# Patient Record
Sex: FEMALE | Race: WHITE | NOT HISPANIC OR LATINO | Employment: OTHER | URBAN - NONMETROPOLITAN AREA
[De-identification: names, ages, dates, MRNs, and addresses within clinical notes are randomized per-mention and may not be internally consistent; named-entity substitution may affect disease eponyms.]

---

## 2020-10-30 ENCOUNTER — HOSPITAL ENCOUNTER (EMERGENCY)
Facility: HOSPITAL | Age: 70
Discharge: HOME | End: 2020-10-30
Attending: EMERGENCY MEDICINE | Admitting: EMERGENCY MEDICINE
Payer: MEDICARE

## 2020-10-30 ENCOUNTER — APPOINTMENT (EMERGENCY)
Dept: RADIOLOGY | Facility: HOSPITAL | Age: 70
End: 2020-10-30
Payer: MEDICARE

## 2020-10-30 VITALS
OXYGEN SATURATION: 91 % | BODY MASS INDEX: 32.81 KG/M2 | TEMPERATURE: 98.2 F | HEART RATE: 59 BPM | HEIGHT: 66 IN | WEIGHT: 204.15 LBS | DIASTOLIC BLOOD PRESSURE: 65 MMHG | RESPIRATION RATE: 18 BRPM | SYSTOLIC BLOOD PRESSURE: 125 MMHG

## 2020-10-30 DIAGNOSIS — U07.1 COVID-19: Primary | ICD-10-CM

## 2020-10-30 LAB
ALBUMIN SERPL BCP-MCNC: 2.9 G/DL (ref 3.5–5)
ALP SERPL-CCNC: 46 U/L (ref 46–116)
ALT SERPL W P-5'-P-CCNC: 31 U/L (ref 12–78)
ANION GAP SERPL CALCULATED.3IONS-SCNC: 8 MMOL/L (ref 4–13)
APTT PPP: 28 SECONDS (ref 23–37)
AST SERPL W P-5'-P-CCNC: 28 U/L (ref 5–45)
ATRIAL RATE: 62 BPM
BASOPHILS # BLD AUTO: 0.03 THOUSANDS/ΜL (ref 0–0.1)
BASOPHILS NFR BLD AUTO: 0 % (ref 0–1)
BILIRUB SERPL-MCNC: 0.5 MG/DL (ref 0.2–1)
BUN SERPL-MCNC: 16 MG/DL (ref 5–25)
CALCIUM ALBUM COR SERPL-MCNC: 9.2 MG/DL (ref 8.3–10.1)
CALCIUM SERPL-MCNC: 8.3 MG/DL (ref 8.3–10.1)
CHLORIDE SERPL-SCNC: 103 MMOL/L (ref 100–108)
CO2 SERPL-SCNC: 27 MMOL/L (ref 21–32)
CREAT SERPL-MCNC: 0.86 MG/DL (ref 0.6–1.3)
EOSINOPHIL # BLD AUTO: 0.02 THOUSAND/ΜL (ref 0–0.61)
EOSINOPHIL NFR BLD AUTO: 0 % (ref 0–6)
ERYTHROCYTE [DISTWIDTH] IN BLOOD BY AUTOMATED COUNT: 12.6 % (ref 11.6–15.1)
FLUAV RNA NPH QL NAA+PROBE: NORMAL
FLUBV RNA NPH QL NAA+PROBE: NORMAL
GFR SERPL CREATININE-BSD FRML MDRD: 69 ML/MIN/1.73SQ M
GLUCOSE SERPL-MCNC: 122 MG/DL (ref 65–140)
HCT VFR BLD AUTO: 40 % (ref 34.8–46.1)
HGB BLD-MCNC: 13 G/DL (ref 11.5–15.4)
IMM GRANULOCYTES # BLD AUTO: 0.06 THOUSAND/UL (ref 0–0.2)
IMM GRANULOCYTES NFR BLD AUTO: 1 % (ref 0–2)
INR PPP: 1.07 (ref 0.84–1.19)
LACTATE SERPL-SCNC: 1 MMOL/L (ref 0.5–2)
LIPASE SERPL-CCNC: 148 U/L (ref 73–393)
LYMPHOCYTES # BLD AUTO: 1.19 THOUSANDS/ΜL (ref 0.6–4.47)
LYMPHOCYTES NFR BLD AUTO: 16 % (ref 14–44)
MAGNESIUM SERPL-MCNC: 2 MG/DL (ref 1.6–2.6)
MCH RBC QN AUTO: 30.2 PG (ref 26.8–34.3)
MCHC RBC AUTO-ENTMCNC: 32.5 G/DL (ref 31.4–37.4)
MCV RBC AUTO: 93 FL (ref 82–98)
MONOCYTES # BLD AUTO: 0.66 THOUSAND/ΜL (ref 0.17–1.22)
MONOCYTES NFR BLD AUTO: 9 % (ref 4–12)
NEUTROPHILS # BLD AUTO: 5.32 THOUSANDS/ΜL (ref 1.85–7.62)
NEUTS SEG NFR BLD AUTO: 74 % (ref 43–75)
NRBC BLD AUTO-RTO: 0 /100 WBCS
P AXIS: 34 DEGREES
PLATELET # BLD AUTO: 174 THOUSANDS/UL (ref 149–390)
PMV BLD AUTO: 10.5 FL (ref 8.9–12.7)
POTASSIUM SERPL-SCNC: 3.5 MMOL/L (ref 3.5–5.3)
PR INTERVAL: 176 MS
PROT SERPL-MCNC: 6.9 G/DL (ref 6.4–8.2)
PROTHROMBIN TIME: 13.7 SECONDS (ref 11.6–14.5)
QRS AXIS: -7 DEGREES
QRSD INTERVAL: 74 MS
QT INTERVAL: 422 MS
QTC INTERVAL: 428 MS
RBC # BLD AUTO: 4.31 MILLION/UL (ref 3.81–5.12)
RSV RNA NPH QL NAA+PROBE: NORMAL
SARS-COV-2 RNA RESP QL NAA+PROBE: POSITIVE
SODIUM SERPL-SCNC: 138 MMOL/L (ref 136–145)
T WAVE AXIS: 49 DEGREES
TROPONIN I SERPL-MCNC: <0.02 NG/ML
TSH SERPL DL<=0.05 MIU/L-ACNC: 0.89 UIU/ML (ref 0.36–3.74)
VENTRICULAR RATE: 62 BPM
WBC # BLD AUTO: 7.28 THOUSAND/UL (ref 4.31–10.16)

## 2020-10-30 PROCEDURE — 93010 ELECTROCARDIOGRAM REPORT: CPT | Performed by: INTERNAL MEDICINE

## 2020-10-30 PROCEDURE — 96361 HYDRATE IV INFUSION ADD-ON: CPT

## 2020-10-30 PROCEDURE — 85610 PROTHROMBIN TIME: CPT | Performed by: PHYSICIAN ASSISTANT

## 2020-10-30 PROCEDURE — 87635 SARS-COV-2 COVID-19 AMP PRB: CPT | Performed by: PHYSICIAN ASSISTANT

## 2020-10-30 PROCEDURE — 96374 THER/PROPH/DIAG INJ IV PUSH: CPT

## 2020-10-30 PROCEDURE — 36415 COLL VENOUS BLD VENIPUNCTURE: CPT | Performed by: PHYSICIAN ASSISTANT

## 2020-10-30 PROCEDURE — 80053 COMPREHEN METABOLIC PANEL: CPT | Performed by: PHYSICIAN ASSISTANT

## 2020-10-30 PROCEDURE — 84484 ASSAY OF TROPONIN QUANT: CPT | Performed by: PHYSICIAN ASSISTANT

## 2020-10-30 PROCEDURE — 85025 COMPLETE CBC W/AUTO DIFF WBC: CPT | Performed by: PHYSICIAN ASSISTANT

## 2020-10-30 PROCEDURE — 87040 BLOOD CULTURE FOR BACTERIA: CPT | Performed by: PHYSICIAN ASSISTANT

## 2020-10-30 PROCEDURE — 99284 EMERGENCY DEPT VISIT MOD MDM: CPT | Performed by: PHYSICIAN ASSISTANT

## 2020-10-30 PROCEDURE — 83605 ASSAY OF LACTIC ACID: CPT | Performed by: PHYSICIAN ASSISTANT

## 2020-10-30 PROCEDURE — 93005 ELECTROCARDIOGRAM TRACING: CPT

## 2020-10-30 PROCEDURE — 99284 EMERGENCY DEPT VISIT MOD MDM: CPT

## 2020-10-30 PROCEDURE — 87631 RESP VIRUS 3-5 TARGETS: CPT | Performed by: PHYSICIAN ASSISTANT

## 2020-10-30 PROCEDURE — 83735 ASSAY OF MAGNESIUM: CPT | Performed by: PHYSICIAN ASSISTANT

## 2020-10-30 PROCEDURE — 84443 ASSAY THYROID STIM HORMONE: CPT | Performed by: PHYSICIAN ASSISTANT

## 2020-10-30 PROCEDURE — 83690 ASSAY OF LIPASE: CPT | Performed by: PHYSICIAN ASSISTANT

## 2020-10-30 PROCEDURE — 71045 X-RAY EXAM CHEST 1 VIEW: CPT

## 2020-10-30 PROCEDURE — 85730 THROMBOPLASTIN TIME PARTIAL: CPT | Performed by: PHYSICIAN ASSISTANT

## 2020-10-30 RX ORDER — ONDANSETRON 2 MG/ML
4 INJECTION INTRAMUSCULAR; INTRAVENOUS ONCE
Status: COMPLETED | OUTPATIENT
Start: 2020-10-30 | End: 2020-10-30

## 2020-10-30 RX ORDER — ALBUTEROL SULFATE 90 UG/1
2 AEROSOL, METERED RESPIRATORY (INHALATION) EVERY 6 HOURS PRN
Qty: 18 G | Refills: 0 | Status: SHIPPED | OUTPATIENT
Start: 2020-10-30

## 2020-10-30 RX ORDER — ONDANSETRON 4 MG/1
4 TABLET, FILM COATED ORAL EVERY 8 HOURS PRN
Qty: 20 TABLET | Refills: 0 | Status: SHIPPED | OUTPATIENT
Start: 2020-10-30

## 2020-10-30 RX ORDER — ONDANSETRON 2 MG/ML
1 INJECTION INTRAMUSCULAR; INTRAVENOUS ONCE
Status: COMPLETED | OUTPATIENT
Start: 2020-10-30 | End: 2020-10-30

## 2020-10-30 RX ADMIN — ONDANSETRON 4 MG: 2 INJECTION INTRAMUSCULAR; INTRAVENOUS at 09:56

## 2020-10-30 RX ADMIN — SODIUM CHLORIDE 1000 ML: 0.9 INJECTION, SOLUTION INTRAVENOUS at 09:56

## 2020-11-04 LAB
BACTERIA BLD CULT: NORMAL
BACTERIA BLD CULT: NORMAL

## 2020-12-08 ENCOUNTER — APPOINTMENT (RX ONLY)
Dept: URBAN - NONMETROPOLITAN AREA CLINIC 4 | Facility: CLINIC | Age: 70
Setting detail: DERMATOLOGY
End: 2020-12-08

## 2020-12-08 DIAGNOSIS — L30.9 DERMATITIS, UNSPECIFIED: ICD-10-CM

## 2020-12-08 PROCEDURE — ? COUNSELING

## 2020-12-08 PROCEDURE — ? BIOPSY BY PUNCH METHOD

## 2020-12-08 PROCEDURE — 11104 PUNCH BX SKIN SINGLE LESION: CPT

## 2020-12-08 ASSESSMENT — SEVERITY ASSESSMENT: SEVERITY: MILD

## 2020-12-08 ASSESSMENT — LOCATION DETAILED DESCRIPTION DERM: LOCATION DETAILED: PERIUMBILICAL SKIN

## 2020-12-08 ASSESSMENT — LOCATION ZONE DERM: LOCATION ZONE: TRUNK

## 2020-12-08 ASSESSMENT — LOCATION SIMPLE DESCRIPTION DERM: LOCATION SIMPLE: ABDOMEN

## 2020-12-08 NOTE — HPI: RASH
Is The Patient Presenting As Previously Scheduled?: Yes
How Severe Is Your Rash?: mild
Is This A New Presentation, Or A Follow-Up?: Rash
Additional History: Patient was seen by another dermatologist that said it was a heat rash and was given high dose of Prednisone. Patient is a retired nurse. Patient had Covid virus 10-.

## 2020-12-08 NOTE — PROCEDURE: BIOPSY BY PUNCH METHOD
Detail Level: Simple
Was A Bandage Applied: Yes
Punch Size In Mm: 4
Biopsy Type: H and E
Anesthesia Type: 1% lidocaine with epinephrine
Anesthesia Volume In Cc (Will Not Render If 0): 1
Additional Anesthesia Volume In Cc (Will Not Render If 0): 0
Hemostasis: Ligature
Epidermal Sutures: 4-0 Ethilon
Wound Care: Petrolatum
Dressing: bandage
Suture Removal: 14 days
Patient Will Remove Sutures At Home?: No
Lab: 6
Lab Facility: 3
Consent: Written consent was obtained and risks were reviewed including but not limited to scarring, infection, bleeding, scabbing, incomplete removal, nerve damage and allergy to anesthesia.
Post-Care Instructions: I reviewed with the patient in detail post-care instructions. Patient is to keep the biopsy site dry overnight, and then apply bacitracin twice daily until healed. Patient may apply hydrogen peroxide soaks to remove any crusting.
Home Suture Removal Text: Patient was provided a home suture removal kit and will remove their sutures at home.  If they have any questions or difficulties they will call the office.
Notification Instructions: Patient will be notified of biopsy results. However, patient instructed to call the office if not contacted within 2 weeks.
Billing Type: Third-Party Bill
Information: Selecting Yes will display possible errors in your note based on the variables you have selected. This validation is only offered as a suggestion for you. PLEASE NOTE THAT THE VALIDATION TEXT WILL BE REMOVED WHEN YOU FINALIZE YOUR NOTE. IF YOU WANT TO FAX A PRELIMINARY NOTE YOU WILL NEED TO TOGGLE THIS TO 'NO' IF YOU DO NOT WANT IT IN YOUR FAXED NOTE.

## 2020-12-21 ENCOUNTER — APPOINTMENT (RX ONLY)
Dept: URBAN - NONMETROPOLITAN AREA CLINIC 4 | Facility: CLINIC | Age: 70
Setting detail: DERMATOLOGY
End: 2020-12-21

## 2020-12-21 DIAGNOSIS — L92.0 GRANULOMA ANNULARE: ICD-10-CM

## 2020-12-21 DIAGNOSIS — Z48.817 ENCOUNTER FOR SURGICAL AFTERCARE FOLLOWING SURGERY ON THE SKIN AND SUBCUTANEOUS TISSUE: ICD-10-CM

## 2020-12-21 PROCEDURE — 99024 POSTOP FOLLOW-UP VISIT: CPT

## 2020-12-21 PROCEDURE — 99213 OFFICE O/P EST LOW 20 MIN: CPT

## 2020-12-21 PROCEDURE — ? COUNSELING

## 2020-12-21 PROCEDURE — ? SUTURE REMOVAL (GLOBAL PERIOD)

## 2020-12-21 PROCEDURE — ? PRESCRIPTION MEDICATION MANAGEMENT

## 2020-12-21 ASSESSMENT — LOCATION DETAILED DESCRIPTION DERM
LOCATION DETAILED: PERIUMBILICAL SKIN
LOCATION DETAILED: RIGHT PROXIMAL ULNAR DORSAL FOREARM
LOCATION DETAILED: LEFT PROXIMAL DORSAL FOREARM

## 2020-12-21 ASSESSMENT — LOCATION ZONE DERM
LOCATION ZONE: ARM
LOCATION ZONE: TRUNK

## 2020-12-21 ASSESSMENT — LOCATION SIMPLE DESCRIPTION DERM
LOCATION SIMPLE: ABDOMEN
LOCATION SIMPLE: RIGHT FOREARM
LOCATION SIMPLE: LEFT FOREARM

## 2020-12-21 NOTE — PROCEDURE: SUTURE REMOVAL (GLOBAL PERIOD)
Detail Level: Detailed
Add 57707 Cpt? (Important Note: In 2017 The Use Of 29424 Is Being Tracked By Cms To Determine Future Global Period Reimbursement For Global Periods): yes

## 2021-02-09 ENCOUNTER — IMMUNIZATIONS (OUTPATIENT)
Dept: FAMILY MEDICINE CLINIC | Facility: HOSPITAL | Age: 71
End: 2021-02-09

## 2021-02-09 DIAGNOSIS — Z23 ENCOUNTER FOR IMMUNIZATION: Primary | ICD-10-CM

## 2021-02-09 PROCEDURE — 0011A SARS-COV-2 / COVID-19 MRNA VACCINE (MODERNA) 100 MCG: CPT

## 2021-02-09 PROCEDURE — 91301 SARS-COV-2 / COVID-19 MRNA VACCINE (MODERNA) 100 MCG: CPT

## 2021-03-10 ENCOUNTER — IMMUNIZATIONS (OUTPATIENT)
Dept: FAMILY MEDICINE CLINIC | Facility: HOSPITAL | Age: 71
End: 2021-03-10

## 2021-03-10 DIAGNOSIS — Z23 ENCOUNTER FOR IMMUNIZATION: Primary | ICD-10-CM

## 2021-03-10 PROCEDURE — 91301 SARS-COV-2 / COVID-19 MRNA VACCINE (MODERNA) 100 MCG: CPT

## 2021-03-10 PROCEDURE — 0012A SARS-COV-2 / COVID-19 MRNA VACCINE (MODERNA) 100 MCG: CPT

## 2022-01-05 NOTE — PROGRESS NOTES
PT Evaluation     Today's date: 2022  Patient name: Lennox Mam  : 1950  MRN: [id number]  Referring provider: Belle Milan MD  Dx:   Encounter Diagnosis     ICD-10-CM    1  DDD (degenerative disc disease), cervical  M50 30    2  Cervical radiculopathy  M54 12                   Assessment  Assessment details:   CURRENT FUNCTIONAL STATUS    Sleep tolerance hours  Sitting tolerance minutes  Standing/ADL tolerance minutes  Walking tolerance feet  Ability to bend forward:     Ability to dress lower extremities:    Ability to arise from sitting:     Ability to enter/exit a vehicle:     Lifting tolerance lbs  Able to tolerate housework  Work status:     SHORT TERM GOALS (2 WEEKS)    Increase lumbar spine AROM % in    Increase lower extremity strength in all weak areas  Improve sitting posture and body mechanics  Decrease pain to      LONG TERM GOALS (DISCHARGE)    Lumbar Spine AROM: F= %, EXT= %, R SB= %, L SB= %  Lower Extremity Strength:    Sitting posture: Body mechanics:  Decrease pain to    Understanding of Dx/Px/POC: good   Prognosis: good    Goals  See assessment details above  Plan  Plan details: Lennox Mam is a 70 y o  female presenting to PT with pain, decreased range of motion, decreased strength, and decreased tolerance to activity  This patient would benefit from skilled PT services to address these issues and to maximize function  A home exercise program was provided and all questions were answered   Thank you for the referral     Patient would benefit from: skilled physical therapy  Planned modality interventions: cryotherapy and thermotherapy: paraffin bath  Planned therapy interventions: manual therapy, neuromuscular re-education, therapeutic exercise, therapeutic activities, self care and home exercise program  Frequency: 2x week  Duration in weeks: 4        Subjective Evaluation    History of Present Illness  Mechanism of injury: CC: Low back pain  HPI: The patient's low back problem began  Patient Goals  Patient goals for therapy: decreased pain and increased motion          Objective     Postural Observations    Additional Postural Observation Details  Gait    General Comments:      Lumbar Comments  CURRENT OBJECTIVE MEASUREMENTS    Lumbar Spine AROM: F= %, EXT= %, R SB= %, L SB= %  Lower Extremity Strength:    Lower Extremity Reflexes:   Lower Extremity Sensation:  Babinski Reflex:  Clonus:   Sitting Posture:    Body mechanics:                      Precautions: None      Manuals 1/12        STM         P/A Mob         Flex/Rot Mob         Hamstring Stretch         Piriformis Stretch                  Neuro Re-Ed         Quadruped Arm Raises         Quadruped Leg Raises         Quadruped Arm and Leg Raises         AB Supine         AB with Arm Raises Supine         AB with Leg Raises Supine         AB with Arm and Leg Raises Supine         Prone TB ROT         Prone Ball Squeeze         Prone Hip EXT         Prone Planks         Squats with Arm Raises         Centralization Concepts         Body Mechanics Instruction for Bending and Lifting         Disc Mechanics Instruction         Sitting Posture Correction with Roll         Sleeping Posture Correction with Roll         Prophylaxis of Recurrence                  Ther Ex         Prone Lying         Elbow Props         Press Ups         Standing Back Bends         Side [place]         LTR         SKTC         DKTC         Flexion in Sitting         Flexion in Standing         Curl Ups Straight         Curl Ups Oblique         BACK EXT:  F , P , C         PYR AB:  S , P         Rotary Torso:  S , P , C         Hoist Row: S         LAT Pulldown         SA Pulldown         Oblique Press         Multi Hip: Flex  F              EXT  P              ABD                  ADD         PYR QUAD:  S , P , C         NuStep:   S , A                  Ther Activity         Squatting         Lifting         Carrying Lunging                  Modalities         HP         Traction

## 2022-01-12 ENCOUNTER — EVALUATION (OUTPATIENT)
Dept: PHYSICAL THERAPY | Facility: CLINIC | Age: 72
End: 2022-01-12
Payer: MEDICARE

## 2022-01-12 DIAGNOSIS — M54.12 CERVICAL RADICULOPATHY: ICD-10-CM

## 2022-01-12 DIAGNOSIS — M50.30 DDD (DEGENERATIVE DISC DISEASE), CERVICAL: Primary | ICD-10-CM

## 2022-01-12 PROCEDURE — 97140 MANUAL THERAPY 1/> REGIONS: CPT | Performed by: PHYSICAL THERAPIST

## 2022-01-12 PROCEDURE — 97535 SELF CARE MNGMENT TRAINING: CPT | Performed by: PHYSICAL THERAPIST

## 2022-01-12 PROCEDURE — 97162 PT EVAL MOD COMPLEX 30 MIN: CPT | Performed by: PHYSICAL THERAPIST

## 2022-01-12 NOTE — LETTER
2022    Radha Retana MD  [address]    Patient: Stephanie Bryan   YOB: 1950   Date of Visit: 2022     Encounter Diagnosis     ICD-10-CM    1  DDD (degenerative disc disease), cervical  M50 30    2  Cervical radiculopathy  M54 12        Dear Dr Radha Trimble:    Thank you for your recent referral of Stephanie Bryan  Please review the attached evaluation summary from Miranda's recent visit  Please verify that you agree with the plan of care by signing the attached order  If you have any questions or concerns, please do not hesitate to call  I sincerely appreciate the opportunity to share in the care of one of your patients and hope to have another opportunity to work with you in the near future  Sincerely,    Donis Tohmas, PT      Referring Provider:      I certify that I have read the below Plan of Care and certify the need for these services furnished under this plan of treatment while under my care  Radha Retana MD  [address] [address]  Via Fax: [phone]          PT Evaluation     Today's date: 2022  Patient name: Stephanie Bryan  : 1950  MRN: [id number]  Referring provider: Jil Zaragoza MD  Dx:   Encounter Diagnosis     ICD-10-CM    1  DDD (degenerative disc disease), cervical  M50 30    2  Cervical radiculopathy  M54 12                   Assessment  Assessment details:   CURRENT FUNCTIONAL STATUS    Sleep tolerance 3 hours  Ability to turn neck: Poor   Ability to look up: Fair   Lifting tolerance 10 lbs  SHORT TERM GOALS (2 WEEKS)    Increase cervical spine AROM 10 % in all planes  Improve sitting posture  Decrease pain to 0-4/10  Sleep tolerance 4 hours  Ability to turn neck: Fair   Ability to look up: Fair/Good  Lifting tolerance 12 lbs  LONG TERM GOALS (DISCHARGE)    Cervical Spine AROM: WFL in all planes  Sitting posture: Good  Decrease pain to 0-2/10    Sleep tolerance 5 hours  Ability to turn neck: Fair/Good   Ability to look up: Good  Lifting tolerance 15 lbs  Understanding of Dx/Px/POC: good   Prognosis: good    Goals  See assessment details above  Plan  Plan details: Joan So is a 70 y o  female presenting to PT with pain, decreased range of motion, decreased strength, and decreased tolerance to activity  This patient would benefit from skilled PT services to address these issues and to maximize function  A home exercise program was provided and all questions were answered  Thank you for the referral       Patient would benefit from: skilled physical therapy  Planned modality interventions: traction  Planned therapy interventions: manual therapy, neuromuscular re-education, therapeutic activities, self care, therapeutic exercise and home exercise program  Frequency: 2x week  Duration in weeks: 4        Subjective Evaluation    History of Present Illness  Mechanism of injury: CC: Neck pain and stiffness, right arm pain and weakness  Numbness first three digits of right hand, dizziness, and headaches  HPI: The patient's neck problem began 3 months ago for no apparent reason  Two weeks ago she reached forward with the right arm and had pain radiate down the arm She had an injection last week, with relief noted for 6 days  She had an MRI recently, but does not know the result  She also is waiting to get an EMG  She does have a prior history of a fall in which she hit her head and suffered a concussion  She had a right RTC repair from that injury  She also had a previous right carpal tunnel release  She applies heat and takes Advil for relief  She is retired and lives with her     Pain  At best pain ratin  At worst pain ratin    Patient Goals  Patient goals for therapy: increased motion and decreased pain          Objective     Postural Observations    Additional Postural Observation Details  Significant forward head sitting posture  General Comments:      Cervical/Thoracic Comments  CURRENT OBJECTIVE MEASUREMENTS    Cervical Spine AROM: F=90 %, EXT=75 %, R ROT=50 %, L ROT=75 %, R SB=25 %, L SB=25 %  Upper Extremity Strength: R=4/5 with pain, L=5/5  Upper Extremity Reflexes: +2 bilaterally, except right brachioradialis +1  Upper Extremity Sensation: Decreased to light touch right thumb, index, and middle fingers    Sitting Posture: Fair                         Precautions: None      Manuals 1/12        STM B C-T RK        P/A Mob         Upper Cervical Traction RK                 Neuro Re-Ed         3 Position Prone Scapular Retraction          Swiss Ball Arm Raises         Sitting Posture Correction with Roll         Centralization Concepts         Body Mechanics Instruction for Lifting         Disc Mechanics Instruction         Prophylaxis of Recurrence                  Ther Ex         Head Retraction Supine 10x TID HEP        Head Retraction Sitting         C EXT AROM         C ROT AROM         C Left SB AROM 10x TID HEP        C FLEX AROM         T EXT AROM         Levator Scapula Stretch         Pectoral Stretch Standing         Pectoral Stretch Supine         Hoist Row: S         LAT Pulldown         TB High Row         TB Mid Row         TB Low Row         TB ER                  Ther Activity         Lifting          Carrying         Reaching                  Modalities         HP         Traction

## 2022-01-12 NOTE — PROGRESS NOTES
PT Evaluation     Today's date: 2022  Patient name: Zoraida Teran  : 1950  MRN: [id number]  Referring provider: Saran Freitas MD  Dx:   Encounter Diagnosis     ICD-10-CM    1  DDD (degenerative disc disease), cervical  M50 30    2  Cervical radiculopathy  M54 12                   Assessment  Assessment details:   CURRENT FUNCTIONAL STATUS    Sleep tolerance 3 hours  Ability to turn neck: Poor   Ability to look up: Fair   Lifting tolerance 10 lbs  SHORT TERM GOALS (2 WEEKS)    Increase cervical spine AROM 10 % in all planes  Improve sitting posture  Decrease pain to 0-4/10  Sleep tolerance 4 hours  Ability to turn neck: Fair   Ability to look up: Fair/Good  Lifting tolerance 12 lbs  LONG TERM GOALS (DISCHARGE)    Cervical Spine AROM: WFL in all planes  Sitting posture: Good  Decrease pain to 0-2/10  Sleep tolerance 5 hours  Ability to turn neck: Fair/Good   Ability to look up: Good  Lifting tolerance 15 lbs  Understanding of Dx/Px/POC: good   Prognosis: good    Goals  See assessment details above  Plan  Plan details: Zoraida Teran is a 70 y o  female presenting to PT with pain, decreased range of motion, decreased strength, and decreased tolerance to activity  This patient would benefit from skilled PT services to address these issues and to maximize function  A home exercise program was provided and all questions were answered  Thank you for the referral       Patient would benefit from: skilled physical therapy  Planned modality interventions: traction  Planned therapy interventions: manual therapy, neuromuscular re-education, therapeutic activities, self care, therapeutic exercise and home exercise program  Frequency: 2x week  Duration in weeks: 4        Subjective Evaluation    History of Present Illness  Mechanism of injury: CC: Neck pain and stiffness, right arm pain and weakness  Numbness first three digits of right hand, dizziness, and headaches    HPI: The patient's neck problem began 3 months ago for no apparent reason  Two weeks ago she reached forward with the right arm and had pain radiate down the arm She had an injection last week, with relief noted for 6 days  She had an MRI recently, but does not know the result  She also is waiting to get an EMG  She does have a prior history of a fall in which she hit her head and suffered a concussion  She had a right RTC repair from that injury  She also had a previous right carpal tunnel release  She applies heat and takes Advil for relief  She is retired and lives with her   Pain  At best pain ratin  At worst pain ratin    Patient Goals  Patient goals for therapy: increased motion and decreased pain          Objective     Postural Observations    Additional Postural Observation Details  Significant forward head sitting posture  General Comments:      Cervical/Thoracic Comments  CURRENT OBJECTIVE MEASUREMENTS    Cervical Spine AROM: F=90 %, EXT=75 %, R ROT=50 %, L ROT=75 %, R SB=25 %, L SB=25 %  Upper Extremity Strength: R=4/5 with pain, L=5/5  Upper Extremity Reflexes: +2 bilaterally, except right brachioradialis +1  Upper Extremity Sensation: Decreased to light touch right thumb, index, and middle fingers    Sitting Posture: Fair                         Precautions: None      Manuals         STM B C-T RK        P/A Mob         Upper Cervical Traction RK                 Neuro Re-Ed         3 Position Prone Scapular Retraction          Swiss Ball Arm Raises         Sitting Posture Correction with Roll         Centralization Concepts         Body Mechanics Instruction for Lifting         Disc Mechanics Instruction         Prophylaxis of Recurrence                  Ther Ex         Head Retraction Supine 10x TID HEP        Head Retraction Sitting         C EXT AROM         C ROT AROM         C Left SB AROM 10x TID HEP        C FLEX AROM         T EXT AROM         Levator Scapula Stretch Pectoral Stretch Standing         Pectoral Stretch Supine         Hoist Row: S         LAT Pulldown         TB High Row         TB Mid Row         TB Low Row         TB ER                  Ther Activity         Lifting          Carrying         Reaching                  Modalities         HP         Traction

## 2022-01-14 ENCOUNTER — OFFICE VISIT (OUTPATIENT)
Dept: PHYSICAL THERAPY | Facility: CLINIC | Age: 72
End: 2022-01-14
Payer: MEDICARE

## 2022-01-14 DIAGNOSIS — M50.30 DDD (DEGENERATIVE DISC DISEASE), CERVICAL: Primary | ICD-10-CM

## 2022-01-14 DIAGNOSIS — M54.12 CERVICAL RADICULOPATHY: ICD-10-CM

## 2022-01-14 PROCEDURE — 97140 MANUAL THERAPY 1/> REGIONS: CPT | Performed by: PHYSICAL THERAPIST

## 2022-01-14 PROCEDURE — 97112 NEUROMUSCULAR REEDUCATION: CPT | Performed by: PHYSICAL THERAPIST

## 2022-01-14 PROCEDURE — 97012 MECHANICAL TRACTION THERAPY: CPT | Performed by: PHYSICAL THERAPIST

## 2022-01-14 PROCEDURE — 97535 SELF CARE MNGMENT TRAINING: CPT | Performed by: PHYSICAL THERAPIST

## 2022-01-14 NOTE — PROGRESS NOTES
Daily Note     Today's date: 2022  Patient name: Delbert Begum  : 1950  MRN: [id number]  Referring provider: Anastacia Kocher, MD  Dx:   Encounter Diagnosis     ICD-10-CM    1  DDD (degenerative disc disease), cervical  M50 30    2  Cervical radiculopathy  M54 12                   Subjective: Patient states that she gets a headache after doing her HEP  Her right hand parasthesias continue  Objective: Cervical retraction supine causes a headache  Left SB causes dizziness  B ROT and retraction sitting are well tolerated  Assessment: Right hand parasthesias were much improved after traction  Plan: Continue per plan of care             Precautions: None      Manuals        STM B C-T RK RK       P/A Mob         Upper Cervical Traction RK                 Neuro Re-Ed         3 Position Prone Scapular Retraction          Swiss Ball Arm Raises         Sitting Posture Correction with Roll         Centralization Concepts  RK       Body Mechanics Instruction for Lifting         Patient education  stenosis  RK       Prophylaxis of Recurrence                  Ther Ex         Head Retraction Supine 10x TID HEP 3x-DC HEP       Head Retraction Sitting  10x TID HEP       C EXT AROM         C ROT AROM  Bilat 10x TID HEP       C Left SB AROM 10x TID HEP 3x-DC HEP       C FLEX AROM         T EXT AROM         Levator Scapula Stretch         Pectoral Stretch Standing         Pectoral Stretch Supine         Hoist Row: S         LAT Pulldown         TB High Row         TB Mid Row         TB Low Row         TB ER                  Ther Activity         Lifting          Carrying         Reaching                  Modalities         HP         Traction: S-I-S 2 steps  20" hold,   10" release  15#  5#  15 min

## 2022-01-18 ENCOUNTER — OFFICE VISIT (OUTPATIENT)
Dept: PHYSICAL THERAPY | Facility: CLINIC | Age: 72
End: 2022-01-18
Payer: MEDICARE

## 2022-01-18 DIAGNOSIS — M54.12 CERVICAL RADICULOPATHY: ICD-10-CM

## 2022-01-18 DIAGNOSIS — M50.30 DDD (DEGENERATIVE DISC DISEASE), CERVICAL: Primary | ICD-10-CM

## 2022-01-18 PROCEDURE — 97012 MECHANICAL TRACTION THERAPY: CPT | Performed by: PHYSICAL THERAPIST

## 2022-01-18 PROCEDURE — 97140 MANUAL THERAPY 1/> REGIONS: CPT | Performed by: PHYSICAL THERAPIST

## 2022-01-18 NOTE — PROGRESS NOTES
Daily Note     Today's date: 2022  Patient name: Ana Wilks  : 1950  MRN: [id number]  Referring provider: Nik Daniels MD  Dx:   Encounter Diagnosis     ICD-10-CM    1  DDD (degenerative disc disease), cervical  M50 30    2  Cervical radiculopathy  M54 12                   Subjective: Patient notes a significant decrease in neck and right arm pain, and in middle finger parasthesias since the first visit  She is not having any dizziness or headache either  Objective: Cervical spine AROM has increased, and cervical muscle spasm has decreased since the initial visit  Assessment: Tolerated treatment well, muscle tightness was decreased after MT  Thumb and index finger parasthesias was unchanged  Patient would benefit from continued PT      Plan: Continue per plan of care             Precautions: None      Manuals       STM B C-T RK RK RK      P/A Mob         Upper Cervical Traction RK                 Neuro Re-Ed         3 Position Prone Scapular Retraction          Swiss Ball Arm Raises         Sitting Posture Correction with Roll         Centralization Concepts  RK       Body Mechanics Instruction for Lifting         Patient education  stenosis  RK       Prophylaxis of Recurrence                  Ther Ex         Head Retraction Supine 10x TID HEP 3x-DC HEP       Head Retraction Sitting  10x TID HEP       C EXT AROM         C ROT AROM  Bilat 10x TID HEP       C Left SB AROM 10x TID HEP 3x-DC HEP       C FLEX AROM         T EXT AROM         Levator Scapula Stretch         Pectoral Stretch Standing         Pectoral Stretch Supine         Hoist Row: S         LAT Pulldown         TB High Row         TB Mid Row         TB Low Row         TB ER                  Ther Activity         Lifting          Carrying         Reaching                  Modalities         HP         Traction: S-I-S 2 steps  20" hold,   10" release  15#  5#  15 min 15#  5#   15 min

## 2022-01-20 ENCOUNTER — OFFICE VISIT (OUTPATIENT)
Dept: PHYSICAL THERAPY | Facility: CLINIC | Age: 72
End: 2022-01-20
Payer: MEDICARE

## 2022-01-20 DIAGNOSIS — M50.30 DDD (DEGENERATIVE DISC DISEASE), CERVICAL: ICD-10-CM

## 2022-01-20 DIAGNOSIS — G89.29 CHRONIC RIGHT SHOULDER PAIN: Primary | ICD-10-CM

## 2022-01-20 DIAGNOSIS — M25.511 CHRONIC RIGHT SHOULDER PAIN: Primary | ICD-10-CM

## 2022-01-20 DIAGNOSIS — M54.12 CERVICAL RADICULOPATHY: ICD-10-CM

## 2022-01-20 PROCEDURE — 97140 MANUAL THERAPY 1/> REGIONS: CPT | Performed by: PHYSICAL THERAPIST

## 2022-01-20 PROCEDURE — 97110 THERAPEUTIC EXERCISES: CPT | Performed by: PHYSICAL THERAPIST

## 2022-01-20 PROCEDURE — 97535 SELF CARE MNGMENT TRAINING: CPT | Performed by: PHYSICAL THERAPIST

## 2022-01-20 NOTE — PROGRESS NOTES
Daily Note     Today's date: 2022  Patient name: Eduard Mancera  : 1950  MRN: [id number]  Referring provider: Mishel Louis MD  Dx:   Encounter Diagnosis     ICD-10-CM    1  Chronic right shoulder pain  M25 511     G89 29    2  DDD (degenerative disc disease), cervical  M50 30    3  Cervical radiculopathy  M54 12                   Subjective: Patient notes much less parsthesias in her right thumb and index finger  She still has discomfort in on both sides of her neck and upper back  She would like to stop  The traction for now  She is also complaining of right shoulder joint and upper arm pain, rated at 2-7/10  It started 3 months ago for no apparent reason  She did have a prior right shoulder RTC tear, and surgery approximately 9 years ago  She is limited to light lifting  Reaching overhead, lying on it, and pushing/pulling are difficult  Objective: PT received evaluate and treat orders from Dr Anurag Borges for right shoulder pain  Shoulder AROM: R/L F=95/130 deg, ABD=140/155 deg, ER=40/40 deg, HBB=Posterior hip/ mid thoracic spine  Shoulder Strength R/L: F=13/16 lbs, ABD=12/18 lbs, ER=11/16 lbs, IR=14/16 lbs  Assessment: C-T muscle tightness was decreased after MT  Patient had good technique with sidelying ER and ABD exercises  Supine shoulder flexion with wand or hands clasped produced twinges of right shoulder pain  Plan: Progress treatment as tolerated              Precautions: None      Manuals      STM B C-T RK RK RK RK     Right shoulder PROM    RK     Upper Cervical Traction RK                 Neuro Re-Ed         Centralization Concepts  RK       Patient education  stenosis  RK                Ther Ex         Head Retraction Supine 10x TID HEP 3x-DC HEP       Head Retraction Sitting   10x TID HEP       C EXT AROM         C ROT AROM  Bilat 10x TID HEP       C Left SB AROM 10x TID HEP 3x-DC HEP       C FLEX AROM         T EXT AROM         Shoulder flexion supine:  Wand/hands    5x each     Shoulder ER sidelying    10x BID HEP     Shoulder ABD sidelying    10x BID HEP     Pulleys         [place]         TB Biceps         TB Mid Row         TB Low Row         TB ER         TB IR         Ther Activity                  Modalities         Traction: S-I-S 2 steps  20" hold,   10" release  15#  5#  15 min 15#  5#   15 min

## 2022-01-25 ENCOUNTER — OFFICE VISIT (OUTPATIENT)
Dept: PHYSICAL THERAPY | Facility: CLINIC | Age: 72
End: 2022-01-25
Payer: MEDICARE

## 2022-01-25 DIAGNOSIS — G89.29 CHRONIC RIGHT SHOULDER PAIN: Primary | ICD-10-CM

## 2022-01-25 DIAGNOSIS — M25.511 CHRONIC RIGHT SHOULDER PAIN: Primary | ICD-10-CM

## 2022-01-25 DIAGNOSIS — M54.12 CERVICAL RADICULOPATHY: ICD-10-CM

## 2022-01-25 DIAGNOSIS — M50.30 DDD (DEGENERATIVE DISC DISEASE), CERVICAL: ICD-10-CM

## 2022-01-25 PROCEDURE — 97110 THERAPEUTIC EXERCISES: CPT | Performed by: PHYSICAL THERAPIST

## 2022-01-25 PROCEDURE — 97535 SELF CARE MNGMENT TRAINING: CPT | Performed by: PHYSICAL THERAPIST

## 2022-01-25 NOTE — PROGRESS NOTES
Daily Note     Today's date: 2022  Patient name: Fei Moreno  : 1950  MRN: [id number]  Referring provider: Jeff Kim MD  Dx:   Encounter Diagnosis     ICD-10-CM    1  Chronic right shoulder pain  M25 511     G89 29    2  DDD (degenerative disc disease), cervical  M50 30    3  Cervical radiculopathy  M54 12    -*--------------------------------               Subjective: Patient states that her neck is doing well, and she feels that it does not need to be treated  She still has right shoulder and upper arm pain, mostly noted when using her arm above shoulder level  Objective: Right shoulder AROM is WFL, impingment noted at mid range flexion and abduction  Assessment: Progressed shoulder HEP as per flow sheet  Some right shoulder and arm soreness was noted afterward, relieved by HP  Plan: Progress treatment as tolerated              Precautions: None      Manuals     STM B C-T RK RK RK RK     Right shoulder PROM    RK     Upper Cervical Traction RK                 Neuro Re-Ed         Centralization Concepts  RK       Patient education  stenosis  RK                Ther Ex         Head Retraction Supine 10x TID HEP 3x-DC HEP       Head Retraction Sitting   10x TID HEP       C EXT AROM         C ROT AROM  Bilat 10x TID HEP       C Left SB AROM 10x TID HEP 3x-DC HEP       C FLEX AROM         T EXT AROM         Shoulder flexion supine:  Wand/hands    5x each     Shoulder ER sidelying    10x BID HEP 10x BID HEP    Shoulder ABD sidelying    10x BID HEP 10x BID HEP    Shoulder flexion sidelying     10x BID HEP    Shoulder horiz abd sidelying     10x BID HEP                      [place]         TB Biceps     L1 10x BID HEP    TB Mid Row     L1 10x BID HEP    TB Triceps     L1 10x BID HEP    TB ER         TB IR         Ther Activity                  Modalities         Traction: S-I-S 2 steps  20" hold,   10" release  15#  5#  15 min 15#  5#   15 min      HP Right shoulder     15 minutes

## 2022-01-28 ENCOUNTER — OFFICE VISIT (OUTPATIENT)
Dept: PHYSICAL THERAPY | Facility: CLINIC | Age: 72
End: 2022-01-28
Payer: MEDICARE

## 2022-01-28 DIAGNOSIS — G89.29 CHRONIC RIGHT SHOULDER PAIN: Primary | ICD-10-CM

## 2022-01-28 DIAGNOSIS — M25.511 CHRONIC RIGHT SHOULDER PAIN: Primary | ICD-10-CM

## 2022-01-28 PROCEDURE — 97110 THERAPEUTIC EXERCISES: CPT | Performed by: PHYSICAL THERAPIST

## 2022-01-28 PROCEDURE — 97535 SELF CARE MNGMENT TRAINING: CPT | Performed by: PHYSICAL THERAPIST

## 2022-01-28 NOTE — PROGRESS NOTES
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 and had increased shoulder pain for a day  Objective: Active shoulder flexion is mildly limited by pain  All other shoulder movements are WNL and painfree  Assessment: Patient noted some neck discomfort during sidelying DB exercises, and right thumb and index finger soreness during TB exercises  Plan: Progress treatment as tolerated              Precautions: None      Manuals    STM B C-T RK RK RK RK     Right shoulder PROM    RK     Upper Cervical Traction RK                 Neuro Re-Ed         Centralization Concepts  RK       Patient education  stenosis  RK                Ther Ex         Head Retraction Supine 10x TID HEP 3x-DC HEP       Head Retraction Sitting   10x TID HEP       C EXT AROM         C ROT AROM  Bilat 10x TID HEP       C Left SB AROM 10x TID HEP 3x-DC HEP       C FLEX AROM         T EXT AROM         Shoulder flexion supine:  Wand/hands    5x each     Shoulder ER sidelying    10x BID HEP 10x BID HEP 1# 10x   Shoulder ABD sidelying    10x BID HEP 10x BID HEP 1# 10x   Shoulder flexion sidelying     10x BID HEP 1# 10x   Shoulder horiz abd sidelying     10x BID HEP 1# 5x                     [place]         TB Biceps     L1 10x BID HEP L2 10x   TB Mid Row     L1 10x BID HEP L2 10x   TB Triceps     L1 10x BID HEP L2 10x   TB Low Row      L2 10x BID HEP   TB ER      L1 10x BID HEP   TB IR      L2 10x BID HEP   Ther Activity                  Modalities         Traction: S-I-S 2 steps  20" hold,   10" release  15#  5#  15 min 15#  5#   15 min      HP Right shoulder     15 minutes 15 min

## 2022-01-31 NOTE — PROGRESS NOTES
PT Re-Evaluation     Today's date: 2/10/2022  Patient name: Rambo Rojas  : 1950  MRN: [id number]  Referring provider: Alex Austin MD  Dx:   No diagnosis found  Assessment  Assessment details:   CURRENT FUNCTIONAL STATUS    Sleep tolerance 3 hours  Ability to turn neck: Poor   Ability to look up: Fair   Lifting tolerance 10 lbs  SHORT TERM GOALS (2 WEEKS)    Increase cervical spine AROM 10 % in all planes  Increase shoulder strength 2-3 lbs in all weak areas  Improve sitting posture  Decrease pain to 0-4/10  Sleep tolerance 4 hours  Ability to turn neck: Fair   Ability to look up: Fair/Good  Lifting tolerance 12 lbs  LONG TERM GOALS (DISCHARGE)    Cervical Spine AROM: WFL in all planes  -partially met  Shoulder AROM: R/L F=95/130 deg, ABD=140/155 deg, ER=40/40 deg, HBB=Posterior hip/mid thoracic spine -partially met   Shoulder Strength R/L: F=13/16 lbs, ABD=12/18 lbs, ER=11/16 lbs, IR=14/16 lbs -partially met  Sitting posture: Good-partially met  Decrease pain to 0-2/10 -partially met  Sleep tolerance 5 hours  -partially met   Ability to turn neck: Fair/Good-partially met  Ability to look up: Good-partially met  Lifting tolerance 15 lbs -partially met      Understanding of Dx/Px/POC: good   Prognosis: good    Goals  See assessment details above  Plan  Plan details: The patient has shown improvement in PT demonstrating decreased pain, increased range of motion, increased strength, and increased tolerance to activity  The patient continues to present with pain, decreased ROM, decreased strength, and decreased tolerance to activity  The patient would benefit from continued skilled PT services to address these issues and to maximize function  The patient will also continue performing their HEP          Patient would benefit from: skilled physical therapy  Planned modality interventions: traction  Planned therapy interventions: manual therapy, neuromuscular re-education, therapeutic activities, self care, therapeutic exercise and home exercise program  Frequency: 2x week  Duration in weeks: 4        Subjective Evaluation    History of Present Illness  Mechanism of injury: Subjective: The patient's  Pain  At best pain ratin  At worst pain ratin    Patient Goals  Patient goals for therapy: increased motion and decreased pain          Objective     Postural Observations    Additional Postural Observation Details  Significant forward head sitting posture  General Comments:      Cervical/Thoracic Comments  CURRENT OBJECTIVE MEASUREMENTS    Cervical Spine AROM: F=90 %, EXT=75 %, R ROT=50 %, L ROT=75 %, R SB=25 %, L SB=25 %  Shoulder AROM: R/L F=95/130 deg, ABD=140/155 deg, ER=40/40 deg, HBB=Posterior hip/mid thoracic spine  Shoulder Strength R/L: F=13/16 lbs, ABD=12/18 lbs, ER=11/16 lbs, IR=14/16 lbs  Upper Extremity Reflexes: +2 bilaterally, except right brachioradialis +1  Upper Extremity Sensation: Decreased to light touch right thumb, index, and middle fingers    Sitting Posture: Fair

## 2022-02-01 ENCOUNTER — APPOINTMENT (OUTPATIENT)
Dept: PHYSICAL THERAPY | Facility: CLINIC | Age: 72
End: 2022-02-01
Payer: MEDICARE

## 2022-02-03 ENCOUNTER — OFFICE VISIT (OUTPATIENT)
Dept: PHYSICAL THERAPY | Facility: CLINIC | Age: 72
End: 2022-02-03
Payer: MEDICARE

## 2022-02-03 DIAGNOSIS — G89.29 CHRONIC RIGHT SHOULDER PAIN: Primary | ICD-10-CM

## 2022-02-03 DIAGNOSIS — M25.511 CHRONIC RIGHT SHOULDER PAIN: Primary | ICD-10-CM

## 2022-02-03 DIAGNOSIS — M54.12 CERVICAL RADICULOPATHY: ICD-10-CM

## 2022-02-03 DIAGNOSIS — M50.30 DDD (DEGENERATIVE DISC DISEASE), CERVICAL: ICD-10-CM

## 2022-02-03 PROCEDURE — 97535 SELF CARE MNGMENT TRAINING: CPT | Performed by: PHYSICAL THERAPIST

## 2022-02-03 NOTE — PROGRESS NOTES
Daily Note     Today's date: 2/3/2022  Patient name: Antonio Hamm  : 1950  MRN: [id number]  Referring provider: Adela Garrett MD  Dx: No diagnosis found  Subjective: ***      Objective: See treatment diary below      Assessment: Tolerated treatment {Tolerated treatment :4042253789}   Patient {assessment:7363506538}      Plan: {PLAN:0882809388}          Precautions: None      Manuals 2/3 1/14 1/18 1/20 1/25 1/28   STM B C-T  RK RK RK     Right shoulder PROM    RK     Upper Cervical Traction                  Neuro Re-Ed         Centralization Concepts  RK       Patient education  stenosis  RK                Ther Ex         Head Retraction Supine  3x-DC HEP       Head Retraction Sitting   10x TID HEP       C EXT AROM         C ROT AROM  Bilat 10x TID HEP       C Left SB AROM  3x-DC HEP       C FLEX AROM         T EXT AROM         Shoulder flexion supine:  Wand/hands    5x each     Shoulder ER sidelying 1# 10x   10x BID HEP 10x BID HEP 1# 10x   Shoulder ABD sidelying 1# 10x   10x BID HEP 10x BID HEP 1# 10x   Shoulder flexion sidelying 1# 10x    10x BID HEP 1# 10x   Shoulder horiz abd sidelying 1# 5x    10x BID HEP 1# 5x                     [place]         TB Biceps L2 10x    L1 10x BID HEP L2 10x   TB Mid Row L2 10x    L1 10x BID HEP L2 10x   TB Triceps L2 10x    L1 10x BID HEP L2 10x   TB Low Row L2 10x     L2 10x BID HEP   TB ER L1 10x     L1 10x BID HEP   TB IR L2 10x     L2 10x BID HEP   Ther Activity                  Modalities         Traction: S-I-S 2 steps  20" hold,   10" release  15#  5#  15 min 15#  5#   15 min      HP Right shoulder     15 minutes 15 min

## 2022-02-03 NOTE — PROGRESS NOTES
PT Discharge    Today's date: 2/3/2022  Patient name: Magno Humphrey  : 1950  MRN: [id number]  Referring provider: Lemuel Mckeon MD  Dx:   Encounter Diagnosis     ICD-10-CM    1  Chronic right shoulder pain  M25 511     G89 29    2  DDD (degenerative disc disease), cervical  M50 30    3  Cervical radiculopathy  M54 12                   Assessment  Assessment details:   CURRENT FUNCTIONAL STATUS    Able to reach overhead  Ability to turn neck:Fair/Good   Ability to look up: Fair/Good  Lifting tolerance 10 lbs  LONG TERM GOALS (DISCHARGE)    Cervical Spine AROM: WFL in all planes  -met  Shoulder AROM: R/L F=125/130 deg, ABD=155/155 deg, ER=40/40 deg, HBB=Sacrum/mid thoracic spine -met   Shoulder Strength R/L: F=16/16 lbs, ABD=15/18 lbs, ER=11/16 lbs, IR=15/16 lbs  -met  Sitting posture: Good-partially met  Decrease pain to 0-2/10 -met  Able to reach overhead-met   Ability to turn neck: Fair/Good-met  Ability to look up: Fair/Good-met  Lifting tolerance 15 lbs -partially met      Understanding of Dx/Px/POC: good   Prognosis: good    Goals  See assessment details above  Plan  Plan details: The patient has shown good improvement in PT demonstrating decreased pain, increased range of motion, increased strength, and increased tolerance to activity  Goals have been met, the patient is satisfied with her current status, and she has been discharged to a home exercise program         Planned modality interventions: traction  Planned therapy interventions: manual therapy, neuromuscular re-education, therapeutic activities, self care, therapeutic exercise and home exercise program        Subjective Evaluation    History of Present Illness  Mechanism of injury: Subjective: The patient's neck pain and right hand parasthesias have resolved  Her right shoulder pain is mild and intermittent  She is limiting lifting to light weight, and is careful reaching overhead   She is pleased with her current status and requests discharge to a [facility] at this time  Pain  Current pain ratin  At best pain ratin  At worst pain ratin  Location: Neck 0/10, Right shoulder 0-2/10  Patient Goals  Patient goals for therapy: increased motion, decreased pain and increased strength          Objective     Postural Observations    Additional Postural Observation Details  Significant forward head sitting posture  General Comments:      Cervical/Thoracic Comments  CURRENT OBJECTIVE MEASUREMENTS    Cervical Spine AROM: WFL in all planes  Shoulder AROM: R/L F=125/130 deg, ABD=155/155 deg, ER=40/40 deg, HBB=Sacrum/mid thoracic spine  Shoulder Strength R/L: F=16/16 lbs, ABD=15/18 lbs, ER=11/16 lbs, IR=15/16 lbs    Sitting Posture: Fair/Good                         Precautions: None        Manuals 2/3    STM B C-T  RK RK RK       Right shoulder PROM      RK       Upper Cervical Traction                              Neuro Re-Ed               Centralization Concepts   RK           Patient education  stenosis   RK                           Ther Ex               Head Retraction Supine  3x-DC HEP           Head Retraction Sitting    10x TID HEP           C EXT AROM               C ROT AROM   Bilat 10x TID HEP           C Left SB AROM  3x-DC HEP           C FLEX AROM               T EXT AROM               Shoulder flexion supine:  Wand/hands       5x each       Shoulder ER sidelying  1# 2/10 BIW HEP     10x BID HEP 10x BID HEP 1# 10x   Shoulder ABD sidelying   1# 2/10 BIW HEP     10x BID HEP 10x BID HEP 1# 10x   Shoulder flexion sidelying   1# 2/10 BIW HEP       10x BID HEP 1# 10x   Shoulder horiz abd sidelying   1# 2/10 BIW HEP       10x BID HEP 1# 5x                                   [place]               TB Biceps  L2 2/10 BIW HEP       L1 10x BID HEP L2 10x   TB Mid Row   L2 2/10 BIW HEP       L1 10x BID HEP L2 10x   TB Triceps   L2 2/10 BIW HEP       L1 10x BID HEP L2 10x   TB Low Row   L2 2/10 BIW HEP         L2 10x BID HEP   TB ER   L1 2/10 BIW HEP         L1 10x BID HEP   TB IR  L2 2/10 BIW HEP         L2 10x BID HEP   Ther Activity                               Modalities               Traction: S-I-S 2 steps  20" hold,   10" release   15#  5#  15 min 15#  5#   15 min         HP Right shoulder         15 minutes 15 min

## 2022-02-03 NOTE — LETTER
February 3, 2022    Tayla Duckworth MD  [facility] [address]    Patient: Sara Hennessy   YOB: 1950   Date of Visit: 2/3/2022     Encounter Diagnosis     ICD-10-CM    1  Chronic right shoulder pain  M25 511     G89 29    2  DDD (degenerative disc disease), cervical  M50 30    3  Cervical radiculopathy  M54 12        Dear Dr Wheatley Files:    Thank you for your recent referral of Sara Hennessy  Please review the attached discharge summary from Miranda's recent visit  If you have any questions or concerns, please do not hesitate to call  I sincerely appreciate the opportunity to share in the care of one of your patients and hope to have another opportunity to work with you in the near future  Sincerely,    Ama Julio, PT      Referring Provider:                          Tayla Duckworth MD  [address] [place] [address]  Via Fax: [phone]          PT Discharge    Today's date: 2/3/2022  Patient name: Sara Hennessy  : 1950  MRN: [id number]  Referring provider: Dinesh Au MD  Dx:   Encounter Diagnosis     ICD-10-CM    1  Chronic right shoulder pain  M25 511     G89 29    2  DDD (degenerative disc disease), cervical  M50 30    3  Cervical radiculopathy  M54 12                   Assessment  Assessment details:   CURRENT FUNCTIONAL STATUS    Able to reach overhead  Ability to turn neck:Fair/Good   Ability to look up: Fair/Good  Lifting tolerance 10 lbs  LONG TERM GOALS (DISCHARGE)    Cervical Spine AROM: WFL in all planes  -met  Shoulder AROM: R/L F=125/130 deg, ABD=155/155 deg, ER=40/40 deg, HBB=Sacrum/mid thoracic spine -met   Shoulder Strength R/L: F=16/16 lbs, ABD=15/18 lbs, ER=11/16 lbs, IR=15/16 lbs  -met  Sitting posture: Good-partially met  Decrease pain to 0-2/10 -met  Able to reach overhead-met   Ability to turn neck: Fair/Good-met  Ability to look up: Fair/Good-met  Lifting tolerance 15 lbs -partially met      Understanding of Dx/Px/POC: good   Prognosis: good    Goals  See assessment details above  Plan  Plan details: The patient has shown good improvement in PT demonstrating decreased pain, increased range of motion, increased strength, and increased tolerance to activity  Goals have been met, the patient is satisfied with her current status, and she has been discharged to a home exercise program         Planned modality interventions: traction  Planned therapy interventions: manual therapy, neuromuscular re-education, therapeutic activities, self care, therapeutic exercise and home exercise program        Subjective Evaluation    History of Present Illness  Mechanism of injury: Subjective: The patient's neck pain and right hand parasthesias have resolved  Her right shoulder pain is mild and intermittent  She is limiting lifting to light weight, and is careful reaching overhead  She is pleased with her current status and requests discharge to a [facility] at this time  Pain  Current pain ratin  At best pain ratin  At worst pain ratin  Location: Neck 0/10, Right shoulder 0-2/10  Patient Goals  Patient goals for therapy: increased motion, decreased pain and increased strength          Objective     Postural Observations    Additional Postural Observation Details  Significant forward head sitting posture  General Comments:      Cervical/Thoracic Comments  CURRENT OBJECTIVE MEASUREMENTS    Cervical Spine AROM: WFL in all planes  Shoulder AROM: R/L F=125/130 deg, ABD=155/155 deg, ER=40/40 deg, HBB=Sacrum/mid thoracic spine  Shoulder Strength R/L: F=16/16 lbs, ABD=15/18 lbs, ER=11/16 lbs, IR=15/16 lbs    Sitting Posture: Fair/Good                         Precautions: None        Manuals 2/3    STM B C-T  RK RK RK       Right shoulder PROM      RK       Upper Cervical Traction                              Neuro Re-Ed               Centralization Concepts   RK           Patient education  stenosis   RK                           Ther Ex               Head Retraction Supine  3x-DC HEP           Head Retraction Sitting    10x TID HEP           C EXT AROM               C ROT AROM   Bilat 10x TID HEP           C Left SB AROM  3x-DC HEP           C FLEX AROM               T EXT AROM               Shoulder flexion supine:  Wand/hands       5x each       Shoulder ER sidelying  1# 2/10 BIW HEP     10x BID HEP 10x BID HEP 1# 10x   Shoulder ABD sidelying   1# 2/10 BIW HEP     10x BID HEP 10x BID HEP 1# 10x   Shoulder flexion sidelying   1# 2/10 BIW HEP       10x BID HEP 1# 10x   Shoulder horiz abd sidelying   1# 2/10 BIW HEP       10x BID HEP 1# 5x                                   [place]               TB Biceps  L2 2/10 BIW HEP       L1 10x BID HEP L2 10x   TB Mid Row   L2 2/10 BIW HEP       L1 10x BID HEP L2 10x   TB Triceps   L2 2/10 BIW HEP       L1 10x BID HEP L2 10x   TB Low Row   L2 2/10 BIW HEP         L2 10x BID HEP   TB ER   L1 2/10 BIW HEP         L1 10x BID HEP   TB IR  L2 2/10 BIW HEP         L2 10x BID HEP   Ther Activity                               Modalities               Traction: S-I-S 2 steps  20" hold,   10" release   15#  5#  15 min 15#  5#   15 min         HP Right shoulder         15 minutes 15 min

## 2022-02-08 ENCOUNTER — APPOINTMENT (OUTPATIENT)
Dept: PHYSICAL THERAPY | Facility: CLINIC | Age: 72
End: 2022-02-08
Payer: MEDICARE

## 2022-02-10 ENCOUNTER — APPOINTMENT (OUTPATIENT)
Dept: PHYSICAL THERAPY | Facility: CLINIC | Age: 72
End: 2022-02-10
Payer: MEDICARE

## 2022-02-15 ENCOUNTER — APPOINTMENT (OUTPATIENT)
Dept: PHYSICAL THERAPY | Facility: CLINIC | Age: 72
End: 2022-02-15
Payer: MEDICARE

## 2022-02-17 ENCOUNTER — APPOINTMENT (OUTPATIENT)
Dept: PHYSICAL THERAPY | Facility: CLINIC | Age: 72
End: 2022-02-17
Payer: MEDICARE

## 2022-02-22 ENCOUNTER — APPOINTMENT (OUTPATIENT)
Dept: PHYSICAL THERAPY | Facility: CLINIC | Age: 72
End: 2022-02-22
Payer: MEDICARE

## 2022-02-24 ENCOUNTER — APPOINTMENT (OUTPATIENT)
Dept: PHYSICAL THERAPY | Facility: CLINIC | Age: 72
End: 2022-02-24
Payer: MEDICARE

## 2022-07-01 ENCOUNTER — HOSPITAL ENCOUNTER (EMERGENCY)
Facility: HOSPITAL | Age: 72
Discharge: HOME | End: 2022-07-01
Attending: EMERGENCY MEDICINE | Admitting: EMERGENCY MEDICINE
Payer: MEDICARE

## 2022-07-01 ENCOUNTER — APPOINTMENT (EMERGENCY)
Dept: RADIOLOGY | Facility: HOSPITAL | Age: 72
End: 2022-07-01
Payer: MEDICARE

## 2022-07-01 VITALS
HEIGHT: 66 IN | OXYGEN SATURATION: 95 % | DIASTOLIC BLOOD PRESSURE: 83 MMHG | SYSTOLIC BLOOD PRESSURE: 124 MMHG | BODY MASS INDEX: 34.62 KG/M2 | HEART RATE: 55 BPM | WEIGHT: 215.39 LBS | RESPIRATION RATE: 18 BRPM

## 2022-07-01 DIAGNOSIS — R07.9 CHEST PAIN: Primary | ICD-10-CM

## 2022-07-01 LAB
2HR DELTA HS TROPONIN: 1 NG/L
ALBUMIN SERPL BCP-MCNC: 3.5 G/DL (ref 3.5–5)
ALP SERPL-CCNC: 68 U/L (ref 46–116)
ALT SERPL W P-5'-P-CCNC: 32 U/L (ref 12–78)
ANION GAP SERPL CALCULATED.3IONS-SCNC: 7 MMOL/L (ref 4–13)
AST SERPL W P-5'-P-CCNC: 18 U/L (ref 5–45)
BASOPHILS # BLD AUTO: 0.04 THOUSANDS/ΜL (ref 0–0.1)
BASOPHILS NFR BLD AUTO: 1 % (ref 0–1)
BILIRUB SERPL-MCNC: 0.52 MG/DL (ref 0.2–1)
BUN SERPL-MCNC: 14 MG/DL (ref 5–25)
CALCIUM SERPL-MCNC: 8.7 MG/DL (ref 8.3–10.1)
CARDIAC TROPONIN I PNL SERPL HS: 2 NG/L
CARDIAC TROPONIN I PNL SERPL HS: 3 NG/L
CHLORIDE SERPL-SCNC: 101 MMOL/L (ref 100–108)
CO2 SERPL-SCNC: 28 MMOL/L (ref 21–32)
CREAT SERPL-MCNC: 0.9 MG/DL (ref 0.6–1.3)
D DIMER PPP FEU-MCNC: 0.52 UG/ML FEU
EOSINOPHIL # BLD AUTO: 0.31 THOUSAND/ΜL (ref 0–0.61)
EOSINOPHIL NFR BLD AUTO: 5 % (ref 0–6)
ERYTHROCYTE [DISTWIDTH] IN BLOOD BY AUTOMATED COUNT: 13 % (ref 11.6–15.1)
FLUAV RNA RESP QL NAA+PROBE: NEGATIVE
FLUBV RNA RESP QL NAA+PROBE: NEGATIVE
GFR SERPL CREATININE-BSD FRML MDRD: 64 ML/MIN/1.73SQ M
GLUCOSE SERPL-MCNC: 108 MG/DL (ref 65–140)
HCT VFR BLD AUTO: 39.7 % (ref 34.8–46.1)
HGB BLD-MCNC: 12.9 G/DL (ref 11.5–15.4)
IMM GRANULOCYTES # BLD AUTO: 0.04 THOUSAND/UL (ref 0–0.2)
IMM GRANULOCYTES NFR BLD AUTO: 1 % (ref 0–2)
LYMPHOCYTES # BLD AUTO: 2.36 THOUSANDS/ΜL (ref 0.6–4.47)
LYMPHOCYTES NFR BLD AUTO: 35 % (ref 14–44)
MCH RBC QN AUTO: 30.1 PG (ref 26.8–34.3)
MCHC RBC AUTO-ENTMCNC: 32.5 G/DL (ref 31.4–37.4)
MCV RBC AUTO: 93 FL (ref 82–98)
MONOCYTES # BLD AUTO: 0.8 THOUSAND/ΜL (ref 0.17–1.22)
MONOCYTES NFR BLD AUTO: 12 % (ref 4–12)
NEUTROPHILS # BLD AUTO: 3.14 THOUSANDS/ΜL (ref 1.85–7.62)
NEUTS SEG NFR BLD AUTO: 46 % (ref 43–75)
NRBC BLD AUTO-RTO: 0 /100 WBCS
PLATELET # BLD AUTO: 188 THOUSANDS/UL (ref 149–390)
PMV BLD AUTO: 10.4 FL (ref 8.9–12.7)
POTASSIUM SERPL-SCNC: 4 MMOL/L (ref 3.5–5.3)
PROT SERPL-MCNC: 7.5 G/DL (ref 6.4–8.2)
RBC # BLD AUTO: 4.29 MILLION/UL (ref 3.81–5.12)
RSV RNA RESP QL NAA+PROBE: NEGATIVE
SARS-COV-2 RNA RESP QL NAA+PROBE: POSITIVE
SODIUM SERPL-SCNC: 136 MMOL/L (ref 136–145)
WBC # BLD AUTO: 6.69 THOUSAND/UL (ref 4.31–10.16)

## 2022-07-01 PROCEDURE — 84484 ASSAY OF TROPONIN QUANT: CPT

## 2022-07-01 PROCEDURE — 71045 X-RAY EXAM CHEST 1 VIEW: CPT

## 2022-07-01 PROCEDURE — 85025 COMPLETE CBC W/AUTO DIFF WBC: CPT

## 2022-07-01 PROCEDURE — 93005 ELECTROCARDIOGRAM TRACING: CPT

## 2022-07-01 PROCEDURE — 36415 COLL VENOUS BLD VENIPUNCTURE: CPT

## 2022-07-01 PROCEDURE — 99285 EMERGENCY DEPT VISIT HI MDM: CPT

## 2022-07-01 PROCEDURE — 0241U HB NFCT DS VIR RESP RNA 4 TRGT: CPT | Performed by: EMERGENCY MEDICINE

## 2022-07-01 PROCEDURE — 99285 EMERGENCY DEPT VISIT HI MDM: CPT | Performed by: EMERGENCY MEDICINE

## 2022-07-01 PROCEDURE — 85379 FIBRIN DEGRADATION QUANT: CPT | Performed by: EMERGENCY MEDICINE

## 2022-07-01 PROCEDURE — 80053 COMPREHEN METABOLIC PANEL: CPT
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
## 2022-07-07 LAB
ATRIAL RATE: 60 BPM
ATRIAL RATE: 62 BPM
P AXIS: 33 DEGREES
P AXIS: 54 DEGREES
PR INTERVAL: 180 MS
PR INTERVAL: 184 MS
QRS AXIS: 2 DEGREES
QRS AXIS: 21 DEGREES
QRSD INTERVAL: 70 MS
QRSD INTERVAL: 70 MS
QT INTERVAL: 424 MS
QT INTERVAL: 430 MS
QTC INTERVAL: 430 MS
QTC INTERVAL: 430 MS
T WAVE AXIS: 44 DEGREES
T WAVE AXIS: 62 DEGREES
VENTRICULAR RATE: 60 BPM
VENTRICULAR RATE: 62 BPM

## 2022-07-07 PROCEDURE — 93010 ELECTROCARDIOGRAM REPORT: CPT | Performed by: INTERNAL MEDICINE

## 2022-11-15 ENCOUNTER — APPOINTMENT (OUTPATIENT)
Dept: LAB | Facility: MEDICAL CENTER | Age: 72
End: 2022-11-15

## 2022-11-15 DIAGNOSIS — D50.9 IRON DEFICIENCY ANEMIA, UNSPECIFIED IRON DEFICIENCY ANEMIA TYPE: ICD-10-CM

## 2022-11-15 DIAGNOSIS — M25.50 PAIN IN JOINT, MULTIPLE SITES: ICD-10-CM

## 2022-11-15 DIAGNOSIS — M79.10 MYALGIA: ICD-10-CM

## 2022-11-15 DIAGNOSIS — R53.83 OTHER FATIGUE: ICD-10-CM

## 2022-11-15 DIAGNOSIS — I10 HYPERTENSION, UNSPECIFIED TYPE: ICD-10-CM

## 2022-11-15 DIAGNOSIS — E03.9 HYPOTHYROIDISM, UNSPECIFIED TYPE: ICD-10-CM

## 2022-11-15 DIAGNOSIS — K21.9 GASTROESOPHAGEAL REFLUX DISEASE, UNSPECIFIED WHETHER ESOPHAGITIS PRESENT: ICD-10-CM

## 2022-11-15 DIAGNOSIS — D64.9 ANEMIA, UNSPECIFIED TYPE: ICD-10-CM

## 2022-11-15 DIAGNOSIS — E55.9 VITAMIN D DEFICIENCY DISEASE: ICD-10-CM

## 2022-11-15 DIAGNOSIS — R10.12 ABDOMINAL PAIN, LEFT UPPER QUADRANT: ICD-10-CM

## 2022-11-15 DIAGNOSIS — E13.69 OTHER SPECIFIED DIABETES MELLITUS WITH OTHER SPECIFIED COMPLICATION, UNSPECIFIED WHETHER LONG TERM INSULIN USE (HCC): ICD-10-CM

## 2022-11-15 DIAGNOSIS — D51.9 ANEMIA DUE TO VITAMIN B12 DEFICIENCY, UNSPECIFIED B12 DEFICIENCY TYPE: ICD-10-CM

## 2022-11-15 LAB
25(OH)D3 SERPL-MCNC: 16 NG/ML (ref 30–100)
ALBUMIN SERPL BCP-MCNC: 3.7 G/DL (ref 3.5–5)
ALP SERPL-CCNC: 70 U/L (ref 46–116)
ALT SERPL W P-5'-P-CCNC: 30 U/L (ref 12–78)
ANION GAP SERPL CALCULATED.3IONS-SCNC: 5 MMOL/L (ref 4–13)
AST SERPL W P-5'-P-CCNC: 23 U/L (ref 5–45)
BASOPHILS # BLD AUTO: 0.06 THOUSANDS/ÂΜL (ref 0–0.1)
BASOPHILS NFR BLD AUTO: 1 % (ref 0–1)
BILIRUB SERPL-MCNC: 0.72 MG/DL (ref 0.2–1)
BUN SERPL-MCNC: 16 MG/DL (ref 5–25)
CALCIUM SERPL-MCNC: 9.5 MG/DL (ref 8.3–10.1)
CHLORIDE SERPL-SCNC: 107 MMOL/L (ref 96–108)
CHOLEST SERPL-MCNC: 219 MG/DL
CO2 SERPL-SCNC: 25 MMOL/L (ref 21–32)
CREAT SERPL-MCNC: 0.92 MG/DL (ref 0.6–1.3)
EOSINOPHIL # BLD AUTO: 0.27 THOUSAND/ÂΜL (ref 0–0.61)
EOSINOPHIL NFR BLD AUTO: 5 % (ref 0–6)
ERYTHROCYTE [DISTWIDTH] IN BLOOD BY AUTOMATED COUNT: 12.9 % (ref 11.6–15.1)
FERRITIN SERPL-MCNC: 60 NG/ML (ref 8–388)
FOLATE SERPL-MCNC: 9.4 NG/ML (ref 3.1–17.5)
GFR SERPL CREATININE-BSD FRML MDRD: 62 ML/MIN/1.73SQ M
GLUCOSE P FAST SERPL-MCNC: 102 MG/DL (ref 65–99)
HCT VFR BLD AUTO: 42.6 % (ref 34.8–46.1)
HDLC SERPL-MCNC: 52 MG/DL
HGB BLD-MCNC: 13.8 G/DL (ref 11.5–15.4)
IMM GRANULOCYTES # BLD AUTO: 0.04 THOUSAND/UL (ref 0–0.2)
IMM GRANULOCYTES NFR BLD AUTO: 1 % (ref 0–2)
IRON SERPL-MCNC: 124 UG/DL (ref 50–170)
LDLC SERPL DIRECT ASSAY-MCNC: 149 MG/DL (ref 0–100)
LYMPHOCYTES # BLD AUTO: 1.41 THOUSANDS/ÂΜL (ref 0.6–4.47)
LYMPHOCYTES NFR BLD AUTO: 27 % (ref 14–44)
MCH RBC QN AUTO: 29.9 PG (ref 26.8–34.3)
MCHC RBC AUTO-ENTMCNC: 32.4 G/DL (ref 31.4–37.4)
MCV RBC AUTO: 92 FL (ref 82–98)
MONOCYTES # BLD AUTO: 0.5 THOUSAND/ÂΜL (ref 0.17–1.22)
MONOCYTES NFR BLD AUTO: 10 % (ref 4–12)
NEUTROPHILS # BLD AUTO: 3.01 THOUSANDS/ÂΜL (ref 1.85–7.62)
NEUTS SEG NFR BLD AUTO: 56 % (ref 43–75)
NRBC BLD AUTO-RTO: 0 /100 WBCS
PLATELET # BLD AUTO: 231 THOUSANDS/UL (ref 149–390)
PMV BLD AUTO: 10.5 FL (ref 8.9–12.7)
POTASSIUM SERPL-SCNC: 4.2 MMOL/L (ref 3.5–5.3)
PROT SERPL-MCNC: 7.4 G/DL (ref 6.4–8.4)
RBC # BLD AUTO: 4.61 MILLION/UL (ref 3.81–5.12)
SODIUM SERPL-SCNC: 137 MMOL/L (ref 135–147)
T3 SERPL-MCNC: 1.1 NG/ML (ref 0.6–1.8)
T4 SERPL-MCNC: 6.2 UG/DL (ref 4.7–13.3)
TRIGL SERPL-MCNC: 167 MG/DL
TSH SERPL DL<=0.05 MIU/L-ACNC: 1.67 UIU/ML (ref 0.45–4.5)
VIT B12 SERPL-MCNC: 422 PG/ML (ref 100–900)
WBC # BLD AUTO: 5.29 THOUSAND/UL (ref 4.31–10.16)

## 2022-11-16 LAB — B BURGDOR IGG+IGM SER-ACNC: <0.2 AI

## 2023-03-27 ENCOUNTER — APPOINTMENT (INPATIENT)
Dept: ULTRASOUND IMAGING | Facility: HOSPITAL | Age: 73
End: 2023-03-27

## 2023-03-27 ENCOUNTER — HOSPITAL ENCOUNTER (INPATIENT)
Facility: HOSPITAL | Age: 73
LOS: 2 days | Discharge: HOME | End: 2023-03-29
Attending: EMERGENCY MEDICINE | Admitting: STUDENT IN AN ORGANIZED HEALTH CARE EDUCATION/TRAINING PROGRAM

## 2023-03-27 ENCOUNTER — APPOINTMENT (INPATIENT)
Dept: CT IMAGING | Facility: HOSPITAL | Age: 73
End: 2023-03-27

## 2023-03-27 DIAGNOSIS — N17.9 AKI (ACUTE KIDNEY INJURY) (HCC): ICD-10-CM

## 2023-03-27 DIAGNOSIS — R19.7 ACUTE DIARRHEA: Primary | ICD-10-CM

## 2023-03-27 DIAGNOSIS — K57.92 DIVERTICULITIS: ICD-10-CM

## 2023-03-27 DIAGNOSIS — N17.9 ACUTE RENAL FAILURE (ARF) (HCC): ICD-10-CM

## 2023-03-27 PROBLEM — R11.2 NAUSEA, VOMITING AND DIARRHEA: Status: ACTIVE | Noted: 2023-03-27

## 2023-03-27 PROBLEM — I10 PRIMARY HYPERTENSION: Status: ACTIVE | Noted: 2023-03-27

## 2023-03-27 LAB
ALBUMIN SERPL BCP-MCNC: 3.8 G/DL (ref 3.5–5)
ALP SERPL-CCNC: 55 U/L (ref 34–104)
ALT SERPL W P-5'-P-CCNC: 14 U/L (ref 7–52)
ANION GAP SERPL CALCULATED.3IONS-SCNC: 8 MMOL/L (ref 4–13)
AST SERPL W P-5'-P-CCNC: 16 U/L (ref 13–39)
BACTERIA UR QL AUTO: NORMAL /HPF
BASOPHILS # BLD AUTO: 0.05 THOUSANDS/ÂΜL (ref 0–0.1)
BASOPHILS NFR BLD AUTO: 1 % (ref 0–1)
BILIRUB SERPL-MCNC: 0.34 MG/DL (ref 0.2–1)
BILIRUB UR QL STRIP: NEGATIVE
BUN SERPL-MCNC: 27 MG/DL (ref 5–25)
CALCIUM SERPL-MCNC: 8.8 MG/DL (ref 8.4–10.2)
CHLORIDE SERPL-SCNC: 103 MMOL/L (ref 96–108)
CLARITY UR: CLEAR
CO2 SERPL-SCNC: 27 MMOL/L (ref 21–32)
COLOR UR: YELLOW
CREAT SERPL-MCNC: 2.82 MG/DL (ref 0.6–1.3)
CREAT UR-MCNC: 58.1 MG/DL
EOSINOPHIL # BLD AUTO: 0.21 THOUSAND/ÂΜL (ref 0–0.61)
EOSINOPHIL NFR BLD AUTO: 3 % (ref 0–6)
ERYTHROCYTE [DISTWIDTH] IN BLOOD BY AUTOMATED COUNT: 12.5 % (ref 11.6–15.1)
GFR SERPL CREATININE-BSD FRML MDRD: 15 ML/MIN/1.73SQ M
GLUCOSE SERPL-MCNC: 100 MG/DL (ref 65–140)
GLUCOSE UR STRIP-MCNC: NEGATIVE MG/DL
HCT VFR BLD AUTO: 40.3 % (ref 34.8–46.1)
HGB BLD-MCNC: 13.5 G/DL (ref 11.5–15.4)
HGB UR QL STRIP.AUTO: ABNORMAL
IMM GRANULOCYTES # BLD AUTO: 0.03 THOUSAND/UL (ref 0–0.2)
IMM GRANULOCYTES NFR BLD AUTO: 1 % (ref 0–2)
KETONES UR STRIP-MCNC: NEGATIVE MG/DL
LEUKOCYTE ESTERASE UR QL STRIP: NEGATIVE
LYMPHOCYTES # BLD AUTO: 1.3 THOUSANDS/ÂΜL (ref 0.6–4.47)
LYMPHOCYTES NFR BLD AUTO: 20 % (ref 14–44)
MCH RBC QN AUTO: 30.9 PG (ref 26.8–34.3)
MCHC RBC AUTO-ENTMCNC: 33.5 G/DL (ref 31.4–37.4)
MCV RBC AUTO: 92 FL (ref 82–98)
MONOCYTES # BLD AUTO: 0.86 THOUSAND/ÂΜL (ref 0.17–1.22)
MONOCYTES NFR BLD AUTO: 13 % (ref 4–12)
NEUTROPHILS # BLD AUTO: 4.04 THOUSANDS/ÂΜL (ref 1.85–7.62)
NEUTS SEG NFR BLD AUTO: 62 % (ref 43–75)
NITRITE UR QL STRIP: NEGATIVE
NON-SQ EPI CELLS URNS QL MICRO: NORMAL /HPF
NRBC BLD AUTO-RTO: 0 /100 WBCS
PH UR STRIP.AUTO: 5.5 [PH]
PLATELET # BLD AUTO: 209 THOUSANDS/UL (ref 149–390)
PMV BLD AUTO: 10 FL (ref 8.9–12.7)
POTASSIUM SERPL-SCNC: 4.7 MMOL/L (ref 3.5–5.3)
PROT SERPL-MCNC: 6.8 G/DL (ref 6.4–8.4)
PROT UR STRIP-MCNC: NEGATIVE MG/DL
RBC # BLD AUTO: 4.37 MILLION/UL (ref 3.81–5.12)
RBC #/AREA URNS AUTO: NORMAL /HPF
SODIUM 24H UR-SCNC: 48 MOL/L
SODIUM SERPL-SCNC: 138 MMOL/L (ref 135–147)
SP GR UR STRIP.AUTO: 1.01 (ref 1–1.03)
UROBILINOGEN UR QL STRIP.AUTO: 0.2 E.U./DL
WBC # BLD AUTO: 6.49 THOUSAND/UL (ref 4.31–10.16)
WBC #/AREA URNS AUTO: NORMAL /HPF

## 2023-03-27 RX ORDER — CITALOPRAM 20 MG/1
20 TABLET ORAL DAILY
Status: DISCONTINUED | OUTPATIENT
Start: 2023-03-27 | End: 2023-03-29 | Stop reason: HOSPADM

## 2023-03-27 RX ORDER — ONDANSETRON 2 MG/ML
4 INJECTION INTRAMUSCULAR; INTRAVENOUS ONCE
Status: COMPLETED | OUTPATIENT
Start: 2023-03-27 | End: 2023-03-27

## 2023-03-27 RX ORDER — MAGNESIUM HYDROXIDE/ALUMINUM HYDROXICE/SIMETHICONE 120; 1200; 1200 MG/30ML; MG/30ML; MG/30ML
30 SUSPENSION ORAL EVERY 6 HOURS PRN
Status: DISCONTINUED | OUTPATIENT
Start: 2023-03-27 | End: 2023-03-29 | Stop reason: HOSPADM

## 2023-03-27 RX ORDER — LOPERAMIDE HYDROCHLORIDE 2 MG/1
2 CAPSULE ORAL ONCE
Status: COMPLETED | OUTPATIENT
Start: 2023-03-27 | End: 2023-03-27

## 2023-03-27 RX ORDER — HEPARIN SODIUM 5000 [USP'U]/ML
5000 INJECTION, SOLUTION INTRAVENOUS; SUBCUTANEOUS EVERY 8 HOURS SCHEDULED
Status: DISCONTINUED | OUTPATIENT
Start: 2023-03-27 | End: 2023-03-29 | Stop reason: HOSPADM

## 2023-03-27 RX ORDER — ACETAMINOPHEN 325 MG/1
650 TABLET ORAL EVERY 6 HOURS PRN
Status: DISCONTINUED | OUTPATIENT
Start: 2023-03-27 | End: 2023-03-29 | Stop reason: HOSPADM

## 2023-03-27 RX ORDER — HYDRALAZINE HYDROCHLORIDE 20 MG/ML
5 INJECTION INTRAMUSCULAR; INTRAVENOUS EVERY 6 HOURS PRN
Status: DISCONTINUED | OUTPATIENT
Start: 2023-03-27 | End: 2023-03-29 | Stop reason: HOSPADM

## 2023-03-27 RX ORDER — ONDANSETRON 2 MG/ML
4 INJECTION INTRAMUSCULAR; INTRAVENOUS EVERY 6 HOURS PRN
Status: DISCONTINUED | OUTPATIENT
Start: 2023-03-27 | End: 2023-03-29 | Stop reason: HOSPADM

## 2023-03-27 RX ORDER — LISINOPRIL 40 MG/1
40 TABLET ORAL DAILY
COMMUNITY
End: 2023-03-29

## 2023-03-27 RX ORDER — CITALOPRAM 20 MG/1
20 TABLET ORAL DAILY
COMMUNITY

## 2023-03-27 RX ORDER — HYDROCHLOROTHIAZIDE 12.5 MG/1
12.5 TABLET ORAL DAILY
COMMUNITY

## 2023-03-27 RX ORDER — SODIUM CHLORIDE 9 MG/ML
75 INJECTION, SOLUTION INTRAVENOUS CONTINUOUS
Status: DISCONTINUED | OUTPATIENT
Start: 2023-03-27 | End: 2023-03-29 | Stop reason: HOSPADM

## 2023-03-27 RX ADMIN — ONDANSETRON 4 MG: 2 INJECTION INTRAMUSCULAR; INTRAVENOUS at 11:13

## 2023-03-27 RX ADMIN — LOPERAMIDE HYDROCHLORIDE 2 MG: 2 CAPSULE ORAL at 11:15

## 2023-03-27 RX ADMIN — SODIUM CHLORIDE 75 ML/HR: 0.9 INJECTION, SOLUTION INTRAVENOUS at 14:31

## 2023-03-27 RX ADMIN — HEPARIN SODIUM 5000 UNITS: 5000 INJECTION INTRAVENOUS; SUBCUTANEOUS at 14:53

## 2023-03-27 RX ADMIN — HEPARIN SODIUM 5000 UNITS: 5000 INJECTION INTRAVENOUS; SUBCUTANEOUS at 21:20

## 2023-03-27 RX ADMIN — SODIUM CHLORIDE 1000 ML: 0.9 INJECTION, SOLUTION INTRAVENOUS at 11:08

## 2023-03-27 RX ADMIN — ACETAMINOPHEN 325MG 650 MG: 325 TABLET ORAL at 20:15

## 2023-03-27 NOTE — H&P
[address]  H&P  Name: Shima Briceño  MRN: [id number]  Unit/Bed#: ES37 I Date of Admission: 3/27/2023   Date of Service: 3/27/2023 I Hospital Day: 0      Assessment/Plan   * [facility]  Assessment & Plan  · Suspect volume loss in the setting of diarrhea  · Baseline Cr 0 9, now with a cr of 2 82  · Check urine studies  · Provide with IVF  · Avoid nephrotoxins and hypotension  · No need for nephrology consult at this time, if kidney function continues to worsen will pursue consult  · Continue to trend creatinine    Nausea, vomiting and diarrhea  Assessment & Plan  · Patient reported nausea, vomiting and diarrhea that started after 1 day of abx  · No longer taking abx, can hold off on any further abx  · Prn medications ordered for nausea  · Patient reports no further diarrhea today  · If diarrhea recurs will obtain stool studies    Primary hypertension  Assessment & Plan  · Hold lisinopril due to HARRIS  · Utilize hydralazine prn  · Avoid hypotension with HARRIS           VTE Pharmacologic Prophylaxis: VTE Score: 3 Moderate Risk (Score 3-4) - Pharmacological DVT Prophylaxis Ordered: heparin  Code Status: Level 1 - Full Code   Discussion with family: Updated  () via phone  Anticipated Length of Stay: Patient will be admitted on an inpatient basis with an anticipated length of stay of greater than 2 midnights secondary to HARRIS requiring IVF  Total Time Spent on Date of Encounter in care of patient: 65 minutes This time was spent on one or more of the following: performing physical exam; counseling and coordination of care; obtaining or reviewing history; documenting in the medical record; reviewing/ordering tests, medications or procedures; communicating with other healthcare professionals and discussing with patient's family/caregivers      Chief Complaint: diarrhea, vomiting    History of Present Illness:  Precious Guan is a 68 y o  female with a PMH of HTN, diverticulitis who presents with diarrhea, nausea, vomiting  Patient states symptoms started one week prior to admission  She states she began with LLQ abdominal pain and diarrhea  She took two days of ciprofloxacin at home because she suspected possible diverticulitis  After no improvement, she called her PCP who recommended CT abdomen and ordered flagyl  CT abdomen reportedly showed only a spigelian hernia  She noted even worsening diarrhea, nausea, and vomiting  She then saw her gastroenterologist who recommended stopping antibiotics  She states she tried to take in sufficient fluids at home to replete  Unfortunately, she awoke this AM and felt extremely lightheaded and had a pre-syncopal event  Therefore, she came to the ED for evaluation  She was found to have a significant HARRIS on presentation  She received an IVF bolus  Review of Systems:  Review of Systems   Constitutional: Positive for appetite change and fatigue  Negative for diaphoresis and fever  Eyes: Negative for photophobia and visual disturbance  Respiratory: Negative for cough, shortness of breath and wheezing  Cardiovascular: Negative for chest pain, palpitations and leg swelling  Gastrointestinal: Positive for abdominal pain, diarrhea, nausea and vomiting  Negative for anal bleeding and blood in stool  Endocrine: Negative for polydipsia, polyphagia and polyuria  Genitourinary: Positive for flank pain  Negative for difficulty urinating, dysuria, enuresis and hematuria  Musculoskeletal: Negative for arthralgias, back pain, gait problem and joint swelling  Skin: Negative for color change, pallor, rash and wound  Allergic/Immunologic: Negative for environmental allergies, food allergies and immunocompromised state  Neurological: Positive for light-headedness  Negative for dizziness, weakness and headaches  Hematological: Negative for adenopathy  Does not bruise/bleed easily     Psychiatric/Behavioral: Negative for confusion  The patient is not nervous/anxious  Past Medical and Surgical History:   Past Medical History:   Diagnosis Date   • Hypertension    • Psychiatric disorder     depression       Past Surgical History:   Procedure Laterality Date   • APPENDECTOMY     • CHOLECYSTECTOMY     • HYSTERECTOMY         Meds/Allergies:  Prior to Admission medications    Medication Sig Start Date End Date Taking? Authorizing Provider   citalopram (CeleXA) 20 mg tablet Take 20 mg by mouth daily   Yes Historical Provider, MD   hydrochlorothiazide (HYDRODIURIL) 12 5 mg tablet Take 12 5 mg by mouth daily   Yes Historical Provider, MD   lisinopril (ZESTRIL) 40 mg tablet Take 40 mg by mouth daily   Yes Historical Provider, MD   ondansetron (ZOFRAN) 4 mg tablet Take 1 tablet (4 mg total) by mouth every 8 (eight) hours as needed for nausea 10/30/20   Jordan Velarde PA-C   albuterol (Ventolin HFA) 90 mcg/act inhaler Inhale 2 puffs every 6 (six) hours as needed for wheezing 10/30/20 3/27/23  Jordan Velarde PA-C     I have reviewed home medications using recent Epic encounter  Allergies: No Known Allergies    Social History:  Marital Status: /Civil Union   Occupation: retired nurse  Patient Pre-hospital Living Situation: Home  Patient Pre-hospital Level of Mobility: walks  Patient Pre-hospital Diet Restrictions: none  Substance Use History:   Social History     Substance and Sexual Activity   Alcohol Use Not Currently     Social History     Tobacco Use   Smoking Status Never   Smokeless Tobacco Never     Social History     Substance and Sexual Activity   Drug Use Never       Family History:  History reviewed  No pertinent family history      Physical Exam:     Vitals:   Blood Pressure: 123/60 (03/27/23 1600)  Pulse: 60 (03/27/23 1600)  Temperature: 97 5 °F (36 4 °C) (03/27/23 1009)  Temp Source: Temporal (03/27/23 1009)  Respirations: 18 (03/27/23 1600)  Weight - Scale: 98 8 kg (217 lb 13 oz) (03/27/23 1009)  SpO2: 95 % (03/27/23 1600)    Physical Exam  Vitals and nursing note reviewed  Constitutional:       General: She is not in acute distress  Appearance: She is obese  She is not ill-appearing  HENT:      Head: Normocephalic and atraumatic  Cardiovascular:      Rate and Rhythm: Normal rate and regular rhythm  Pulses: Normal pulses  Heart sounds: Normal heart sounds  Abdominal:      Tenderness: There is no abdominal tenderness  There is no guarding or rebound  Comments: Hyperactive bowel sounds   Musculoskeletal:         General: Normal range of motion  Cervical back: Normal range of motion and neck supple  Right lower leg: No edema  Left lower leg: No edema  Skin:     General: Skin is warm and dry  Neurological:      General: No focal deficit present  Mental Status: She is alert and oriented to person, place, and time  Mental status is at baseline  Psychiatric:         Mood and Affect: Mood normal          Behavior: Behavior normal           Additional Data:     Lab Results:  Results from last 7 days   Lab Units 03/27/23  1104   WBC Thousand/uL 6 49   HEMOGLOBIN g/dL 13 5   HEMATOCRIT % 40 3   PLATELETS Thousands/uL 209   NEUTROS PCT % 62   LYMPHS PCT % 20   MONOS PCT % 13*   EOS PCT % 3     Results from last 7 days   Lab Units 03/27/23  1104   SODIUM mmol/L 138   POTASSIUM mmol/L 4 7   CHLORIDE mmol/L 103   CO2 mmol/L 27   BUN mg/dL 27*   CREATININE mg/dL 2 82*   ANION GAP mmol/L 8   CALCIUM mg/dL 8 8   ALBUMIN g/dL 3 8   TOTAL BILIRUBIN mg/dL 0 34   ALK PHOS U/L 55   ALT U/L 14   AST U/L 16   GLUCOSE RANDOM mg/dL 100                       Lines/Drains:  Invasive Devices     Peripheral Intravenous Line  Duration           Peripheral IV 07/01/22 Left Antecubital 269 days    Peripheral IV 03/27/23 Right Antecubital <1 day                    Imaging: No pertinent imaging reviewed    US kidney and bladder    (Results Pending)       EKG and Other Studies Reviewed on Admission: · EKG: No EKG obtained  ** Please Note: This note has been constructed using a voice recognition system   **

## 2023-03-27 NOTE — ASSESSMENT & PLAN NOTE
· Patient reported nausea, vomiting and diarrhea that started after 1 day of abx  · No longer taking abx, can hold off on any further abx  · Prn medications ordered for nausea  · Patient reports no further diarrhea today  · If diarrhea recurs will obtain stool studies

## 2023-03-27 NOTE — ASSESSMENT & PLAN NOTE
· Reportedly had a CT abdomen and pelvis outpatient that showed uncomplicated diverticulitis  · Patient declining

## 2023-03-27 NOTE — ED PROVIDER NOTES
"History  Chief Complaint   Patient presents with   • Diarrhea     Pt states she had diverticulitis last week, se now has diarrhea, unable to keep anything in  Pt states \"I need a food challenge and some fluids\"     Patient complains of ongoing watery diarrhea with nausea since having left-sided abdominal pain a week ago that was consistent with prior episodes of diverticulitis  She saw her PCP and was started on Cipro and Flagyl  She was sent for an outpatient CT a/p which demonstrated acute diverticulitis without perforation  She saw her gastroenterologist who also told her to take a liquid diet  Since then, she has had intractable nausea and watery stools that \"just run out\", including awakening this morning with watery stool in her pajamas  She has had 2 more episodes of watery stool today that she was able to control, making it to the bathroom each time  She was able to eat some more solid foods yesterday but still is battling and nausea  She now feels weak, foggy and generally spent as though she is dehydrated  Vital signs and physical exam do not suggest dehydration  She took 1 tablet of Imodium this morning without relief  No hematochezia, melena or hematemesis  No change in symptoms w/voiding  No recent travel or similar sick contacts  Denies f/c, CP, SOB, dysuria or hematuria  12 system ROS o/w negative              History provided by:  Patient and medical records  Diarrhea  Quality:  Watery  Severity:  Moderate  Onset quality:  Sudden  Number of episodes:  Multiple  Duration:  1 week  Timing:  Intermittent  Progression:  Worsening  Relieved by:  Nothing  Worsened by:  Change in diet  Ineffective treatments:  Anti-motility medications (Though did not take an appropriate starting dose)  Associated symptoms: no abdominal pain, no arthralgias, no chills, no recent cough, no diaphoresis, no fever, no headaches, no myalgias and no vomiting    Risk factors: recent antibiotic use    Risk factors: no " sick contacts, no suspicious food intake and no travel to endemic areas        Prior to Admission Medications   Prescriptions Last Dose Informant Patient Reported? Taking?   citalopram (CeleXA) 20 mg tablet   Yes Yes   Sig: Take 20 mg by mouth daily   hydrochlorothiazide (HYDRODIURIL) 12 5 mg tablet   Yes Yes   Sig: Take 12 5 mg by mouth daily   lisinopril (ZESTRIL) 40 mg tablet   Yes Yes   Sig: Take 40 mg by mouth daily   ondansetron (ZOFRAN) 4 mg tablet   No No   Sig: Take 1 tablet (4 mg total) by mouth every 8 (eight) hours as needed for nausea      Facility-Administered Medications: None       Past Medical History:   Diagnosis Date   • Hypertension    • Psychiatric disorder     depression       Past Surgical History:   Procedure Laterality Date   • APPENDECTOMY     • CHOLECYSTECTOMY     • HYSTERECTOMY         History reviewed  No pertinent family history  I have reviewed and agree with the history as documented  E-Cigarette/Vaping   • E-Cigarette Use Never User      E-Cigarette/Vaping Substances     Social History     Tobacco Use   • Smoking status: Never   • Smokeless tobacco: Never   Vaping Use   • Vaping Use: Never used   Substance Use Topics   • Alcohol use: Not Currently   • Drug use: Never       Review of Systems   Constitutional: Negative for chills, diaphoresis and fever  HENT: Negative for rhinorrhea, sore throat and trouble swallowing  Respiratory: Negative for cough, chest tightness, shortness of breath and wheezing  Cardiovascular: Negative for chest pain, palpitations and leg swelling  Gastrointestinal: Positive for diarrhea  Negative for abdominal distention, abdominal pain, constipation, nausea and vomiting  Genitourinary: Negative for difficulty urinating, dysuria, flank pain, frequency, hematuria and urgency  Musculoskeletal: Negative for arthralgias, back pain, myalgias, neck pain and neck stiffness  Skin: Negative for pallor and rash     Neurological: Negative for dizziness, weakness, light-headedness and headaches  Hematological: Negative for adenopathy  Psychiatric/Behavioral: Negative for confusion  The patient is not nervous/anxious  All other systems reviewed and are negative  Physical Exam  Physical Exam  Vitals reviewed  Constitutional:       General: She is not in acute distress  Appearance: She is well-developed  She is not ill-appearing, toxic-appearing or diaphoretic  HENT:      Head: Normocephalic  Right Ear: External ear normal       Left Ear: External ear normal       Nose: Nose normal  No rhinorrhea  Mouth/Throat:      Mouth: Mucous membranes are moist       Pharynx: No oropharyngeal exudate  Eyes:      General: No scleral icterus  Conjunctiva/sclera: Conjunctivae normal       Pupils: Pupils are equal, round, and reactive to light  Cardiovascular:      Rate and Rhythm: Normal rate and regular rhythm  Heart sounds: No murmur heard  Pulmonary:      Effort: Pulmonary effort is normal       Breath sounds: Normal breath sounds  Abdominal:      General: There is no distension  Palpations: Abdomen is soft  Tenderness: There is no abdominal tenderness  There is no right CVA tenderness or left CVA tenderness  Comments: Increased bowel sounds throughout   Musculoskeletal:         General: No tenderness  Normal range of motion  Cervical back: Normal range of motion and neck supple  Right lower leg: No edema  Left lower leg: No edema  Lymphadenopathy:      Cervical: No cervical adenopathy  Skin:     General: Skin is warm and dry  Capillary Refill: Capillary refill takes less than 2 seconds  Coloration: Skin is not pale  Findings: No erythema or rash  Neurological:      General: No focal deficit present  Mental Status: She is alert and oriented to person, place, and time  Cranial Nerves: No cranial nerve deficit  Motor: No abnormal muscle tone        Deep Tendon Reflexes: Reflexes are normal and symmetric  Psychiatric:         Mood and Affect: Mood normal          Behavior: Behavior normal          Thought Content:  Thought content normal          Vital Signs  ED Triage Vitals [03/27/23 1009]   Temperature Pulse Respirations Blood Pressure SpO2   97 5 °F (36 4 °C) 58 18 129/59 96 %      Temp Source Heart Rate Source Patient Position - Orthostatic VS BP Location FiO2 (%)   Temporal Monitor Lying Right arm --      Pain Score       No Pain           Vitals:    03/27/23 1009   BP: 129/59   Pulse: 58   Patient Position - Orthostatic VS: Lying         Visual Acuity      ED Medications  Medications   ondansetron (ZOFRAN) injection 4 mg (4 mg Intravenous Given 3/27/23 1113)   sodium chloride 0 9 % bolus 1,000 mL (1,000 mL Intravenous New Bag 3/27/23 1108)   loperamide (IMODIUM) capsule 2 mg (2 mg Oral Given 3/27/23 1115)       Diagnostic Studies  Results Reviewed     Procedure Component Value Units Date/Time    CMP [[id number]]  (Abnormal) Collected: 03/27/23 1104    Lab Status: Final result Specimen: Blood from Arm, Right Updated: 03/27/23 1126     Sodium 138 mmol/L      Potassium 4 7 mmol/L      Chloride 103 mmol/L      CO2 27 mmol/L      ANION GAP 8 mmol/L      BUN 27 mg/dL      Creatinine 2 82 mg/dL      Glucose 100 mg/dL      Calcium 8 8 mg/dL      AST 16 U/L      ALT 14 U/L      Alkaline Phosphatase 55 U/L      Total Protein 6 8 g/dL      Albumin 3 8 g/dL      Total Bilirubin 0 34 mg/dL      eGFR 15 ml/min/1 73sq m     Narrative:      [place] guidelines for Chronic Kidney Disease (CKD):   •  Stage 1 with normal or high GFR (GFR > 90 mL/min/1 73 square meters)  •  Stage 2 Mild CKD (GFR = 60-89 mL/min/1 73 square meters)  •  Stage 3A Moderate CKD (GFR = 45-59 mL/min/1 73 square meters)  •  Stage 3B Moderate CKD (GFR = 30-44 mL/min/1 73 square meters)  •  Stage 4 Severe CKD (GFR = 15-29 mL/min/1 73 square meters)  •  Stage 5 End Stage CKD (GFR <15 mL/min/1 73 square meters)  Note: GFR calculation is accurate only with a steady state creatinine    CBC and differential [[id number]]  (Abnormal) Collected: 03/27/23 1104    Lab Status: Final result Specimen: Blood from Arm, Right Updated: 03/27/23 1108     WBC 6 49 Thousand/uL      RBC 4 37 Million/uL      Hemoglobin 13 5 g/dL      Hematocrit 40 3 %      MCV 92 fL      MCH 30 9 pg      MCHC 33 5 g/dL      RDW 12 5 %      MPV 10 0 fL      Platelets 499 Thousands/uL      nRBC 0 /100 WBCs      Neutrophils Relative 62 %      Immat GRANS % 1 %      Lymphocytes Relative 20 %      Monocytes Relative 13 %      Eosinophils Relative 3 %      Basophils Relative 1 %      Neutrophils Absolute 4 04 Thousands/µL      Immature Grans Absolute 0 03 Thousand/uL      Lymphocytes Absolute 1 30 Thousands/µL      Monocytes Absolute 0 86 Thousand/µL      Eosinophils Absolute 0 21 Thousand/µL      Basophils Absolute 0 05 Thousands/µL                  CT abdomen pelvis wo contrast    (Results Pending)              Procedures  Procedures         ED Course  ED Course as of 03/27/23 1249   Mon Mar 27, 2023   1126 Creatinine(!): 2 82  Acutely elevated  Baseline is less than 1  IV hydration underway  1135 Results reviewed with patient  Feels better  All questions answered to the patient's satisfaction  Recommend admission for continued IVF and monitoring of renal function  Medical Decision Making  MDM: Nausea and diarrhea, weakness - volume depletion, abnormal electrolytes, HARRIS, less likely but at risk for ARF, dehydration  I independently reviewed and interpreted ordered labs from this encounter  A/P: Will check abdominal labs, treat symptoms, reevaluate for further work up and disposition  Amount and/or Complexity of Data Reviewed  Labs: ordered  Risk  Prescription drug management            Disposition  Final diagnoses:   Acute diarrhea   Acute renal failure (ARF) (HCC)   Diverticulitis Time reflects when diagnosis was documented in both MDM as applicable and the Disposition within this note     Time User Action Codes Description Comment    3/27/2023 11:28 AM Leti Che Add [R19 7] Acute diarrhea     3/27/2023 11:28 AM Darnell Barry Add [N17 9] Acute renal failure (ARF) ([facility] [address] )     3/27/2023 11:28 AM Cintia Barryise Sehllie [K57 [id number]] Diverticulitis       ED Disposition     ED Disposition   Admit    Condition   Stable    Date/Time   Mon Mar 27, 2023 12:04 PM    Comment   Case was discussed with Dr Bambi Fitzpatrick and the patient's admission status was agreed to be Admission Status: inpatient status to the service of Dr Bambi Fitzpatrick    Follow-up Information    None         Patient's Medications   Discharge Prescriptions    No medications on file       No discharge procedures on file      PDMP Review     None          ED Provider  Electronically Signed by           Samara Vasquez DO  03/27/23 8309

## 2023-03-27 NOTE — ED PROCEDURE NOTE
PROCEDURE  CriticalCare Time    Date/Time: 3/27/2023 11:30 AM  Performed by: Samara Vasquez DO  Authorized by:  Samara Vasquez DO     Critical care provider statement:     Critical care time (minutes):  30    Critical care time was exclusive of:  Separately billable procedures and treating other patients and teaching time    Critical care was necessary to treat or prevent imminent or life-threatening deterioration of the following conditions:  Renal failure    Critical care was time spent personally by me on the following activities:  Obtaining history from patient or surrogate, development of treatment plan with patient or surrogate, discussions with consultants, evaluation of patient's response to treatment, examination of patient, ordering and performing treatments and interventions, ordering and review of laboratory studies, ordering and review of radiographic studies, re-evaluation of patient's condition and review of old charts    I assumed direction of critical care for this patient from another provider in my specialty: no           Samara Vasquez DO  03/27/23 9504

## 2023-03-27 NOTE — ASSESSMENT & PLAN NOTE
· Suspect volume loss in the setting of diarrhea  · Baseline Cr 0 9, now with a cr of 2 82  · Check urine studies  · Provide with IVF  · Avoid nephrotoxins and hypotension  · No need for nephrology consult at this time, if kidney function continues to worsen will pursue consult  · Continue to trend creatinine

## 2023-03-28 LAB
ALBUMIN SERPL BCP-MCNC: 3.4 G/DL (ref 3.5–5)
ALP SERPL-CCNC: 52 U/L (ref 34–104)
ALT SERPL W P-5'-P-CCNC: 11 U/L (ref 7–52)
ANION GAP SERPL CALCULATED.3IONS-SCNC: 8 MMOL/L (ref 4–13)
AST SERPL W P-5'-P-CCNC: 15 U/L (ref 13–39)
BASOPHILS # BLD AUTO: 0.06 THOUSANDS/ÂΜL (ref 0–0.1)
BASOPHILS NFR BLD AUTO: 1 % (ref 0–1)
BILIRUB SERPL-MCNC: 0.38 MG/DL (ref 0.2–1)
BUN SERPL-MCNC: 23 MG/DL (ref 5–25)
CALCIUM ALBUM COR SERPL-MCNC: 9.1 MG/DL (ref 8.3–10.1)
CALCIUM SERPL-MCNC: 8.6 MG/DL (ref 8.4–10.2)
CHLORIDE SERPL-SCNC: 107 MMOL/L (ref 96–108)
CO2 SERPL-SCNC: 25 MMOL/L (ref 21–32)
CREAT SERPL-MCNC: 2.16 MG/DL (ref 0.6–1.3)
EOSINOPHIL # BLD AUTO: 0.25 THOUSAND/ÂΜL (ref 0–0.61)
EOSINOPHIL NFR BLD AUTO: 5 % (ref 0–6)
ERYTHROCYTE [DISTWIDTH] IN BLOOD BY AUTOMATED COUNT: 12.4 % (ref 11.6–15.1)
GFR SERPL CREATININE-BSD FRML MDRD: 22 ML/MIN/1.73SQ M
GLUCOSE SERPL-MCNC: 87 MG/DL (ref 65–140)
HCT VFR BLD AUTO: 36.6 % (ref 34.8–46.1)
HGB BLD-MCNC: 11.8 G/DL (ref 11.5–15.4)
IMM GRANULOCYTES # BLD AUTO: 0.03 THOUSAND/UL (ref 0–0.2)
IMM GRANULOCYTES NFR BLD AUTO: 1 % (ref 0–2)
LYMPHOCYTES # BLD AUTO: 1.45 THOUSANDS/ÂΜL (ref 0.6–4.47)
LYMPHOCYTES NFR BLD AUTO: 28 % (ref 14–44)
MAGNESIUM SERPL-MCNC: 2.2 MG/DL (ref 1.9–2.7)
MCH RBC QN AUTO: 29.7 PG (ref 26.8–34.3)
MCHC RBC AUTO-ENTMCNC: 32.2 G/DL (ref 31.4–37.4)
MCV RBC AUTO: 92 FL (ref 82–98)
MONOCYTES # BLD AUTO: 0.6 THOUSAND/ÂΜL (ref 0.17–1.22)
MONOCYTES NFR BLD AUTO: 12 % (ref 4–12)
NEUTROPHILS # BLD AUTO: 2.81 THOUSANDS/ÂΜL (ref 1.85–7.62)
NEUTS SEG NFR BLD AUTO: 53 % (ref 43–75)
NRBC BLD AUTO-RTO: 0 /100 WBCS
PLATELET # BLD AUTO: 197 THOUSANDS/UL (ref 149–390)
PMV BLD AUTO: 10.5 FL (ref 8.9–12.7)
POTASSIUM SERPL-SCNC: 4.3 MMOL/L (ref 3.5–5.3)
PROT SERPL-MCNC: 6 G/DL (ref 6.4–8.4)
RBC # BLD AUTO: 3.97 MILLION/UL (ref 3.81–5.12)
SODIUM SERPL-SCNC: 140 MMOL/L (ref 135–147)
WBC # BLD AUTO: 5.2 THOUSAND/UL (ref 4.31–10.16)

## 2023-03-28 RX ORDER — SACCHAROMYCES BOULARDII 250 MG
250 CAPSULE ORAL 2 TIMES DAILY
Status: DISCONTINUED | OUTPATIENT
Start: 2023-03-28 | End: 2023-03-29 | Stop reason: HOSPADM

## 2023-03-28 RX ADMIN — ACETAMINOPHEN 325MG 650 MG: 325 TABLET ORAL at 05:06

## 2023-03-28 RX ADMIN — Medication 250 MG: at 17:56

## 2023-03-28 RX ADMIN — HEPARIN SODIUM 5000 UNITS: 5000 INJECTION INTRAVENOUS; SUBCUTANEOUS at 13:01

## 2023-03-28 RX ADMIN — CITALOPRAM HYDROBROMIDE 20 MG: 20 TABLET ORAL at 08:11

## 2023-03-28 RX ADMIN — ACETAMINOPHEN 325MG 650 MG: 325 TABLET ORAL at 11:24

## 2023-03-28 RX ADMIN — ACETAMINOPHEN 325MG 650 MG: 325 TABLET ORAL at 21:09

## 2023-03-28 RX ADMIN — SODIUM CHLORIDE 75 ML/HR: 0.9 INJECTION, SOLUTION INTRAVENOUS at 10:23

## 2023-03-28 RX ADMIN — ONDANSETRON 4 MG: 2 INJECTION INTRAMUSCULAR; INTRAVENOUS at 08:14

## 2023-03-28 RX ADMIN — Medication 250 MG: at 12:53

## 2023-03-28 RX ADMIN — SODIUM CHLORIDE 75 ML/HR: 0.9 INJECTION, SOLUTION INTRAVENOUS at 23:24

## 2023-03-28 RX ADMIN — HEPARIN SODIUM 5000 UNITS: 5000 INJECTION INTRAVENOUS; SUBCUTANEOUS at 21:03

## 2023-03-28 RX ADMIN — HEPARIN SODIUM 5000 UNITS: 5000 INJECTION INTRAVENOUS; SUBCUTANEOUS at 05:06

## 2023-03-28 NOTE — ASSESSMENT & PLAN NOTE
· Patient reported nausea, vomiting and diarrhea that started after 1 day of abx  · No longer taking abx, can hold off on any further abx  · PRN medications ordered for nausea  · Patient reports no further diarrhea, no stool studies were obtained  · Advance diet as tolerated  · We will add Florastor per patient's request

## 2023-03-28 NOTE — ASSESSMENT & PLAN NOTE
· Suspect volume loss in the setting of diarrhea  · Baseline Cr 0 9, now with a Cr of 2 82 on admission, down to 2 1 with IV fluid expansion  · Continue IV fluids  · Monitor renal function and urine output

## 2023-03-28 NOTE — PLAN OF CARE
Problem: PAIN - ADULT  Goal: Verbalizes/displays adequate comfort level or baseline comfort level  Description: Interventions:  - Encourage patient to monitor pain and request assistance  - Assess pain using appropriate pain scale  - Administer analgesics based on type and severity of pain and evaluate response  - Implement non-pharmacological measures as appropriate and evaluate response  - Consider cultural and social influences on pain and pain management  - Notify physician/advanced practitioner if interventions unsuccessful or patient reports new pain  Outcome: Progressing     Problem: INFECTION - ADULT  Goal: Absence or prevention of progression during hospitalization  Description: INTERVENTIONS:  - Assess and monitor for signs and symptoms of infection  - Monitor lab/diagnostic results  - Administer medications as ordered  - Instruct and encourage patient and family to use good hand hygiene technique  - Identify and instruct in appropriate isolation precautions for identified infection/condition  Outcome: Progressing     Problem: SAFETY ADULT  Goal: Patient will remain free of falls  Description: INTERVENTIONS:  - Educate patient/family on patient safety including physical limitations  - Instruct patient to call for assistance with activity   - Consult OT/PT to assist with strengthening/mobility   - Keep Call bell within reach  - Keep bed low and locked with side rails adjusted as appropriate  - Keep care items and personal belongings within reach  - Initiate and maintain comfort rounds  - Apply yellow socks and bracelet for high fall risk patients  - Consider moving patient to room near nurses station  Outcome: Progressing  Goal: Maintain or return to baseline ADL function  Description: INTERVENTIONS:  -  Assess patient's ability to carry out ADLs; assess patient's baseline for ADL function and identify physical deficits which impact ability to perform ADLs (bathing, care of mouth/teeth, toileting, grooming, dressing, etc )  - Assess/evaluate cause of self-care deficits   - Assess range of motion  - Assess patient's mobility; develop plan if impaired  - Assess patient's need for assistive devices and provide as appropriate  - Encourage maximum independence but intervene and supervise when necessary  - Involve family in performance of ADLs  - Assess for home care needs following discharge   - Consider OT consult to assist with ADL evaluation and planning for discharge  - Provide patient education as appropriate  Outcome: Progressing  Goal: Maintains/Returns to pre admission functional level  Description: INTERVENTIONS:  - Perform BMAT or MOVE assessment daily    - Set and communicate daily mobility goal to care team and patient/family/caregiver     - Collaborate with rehabilitation services on mobility goals if consulted  - Ambulate patient 3 times a day  - Out of bed to chair 3 times a day   - Out of bed for meals 3 times a day  - Out of bed for toileting  - Record patient progress and toleration of activity level   Outcome: Progressing     Problem: DISCHARGE PLANNING  Goal: Discharge to home or other facility with appropriate resources  Description: INTERVENTIONS:  - Identify barriers to discharge w/patient and caregiver  - Arrange for needed discharge resources and transportation as appropriate  - Identify discharge learning needs (meds, wound care, etc )  - Arrange for interpretive services to assist at discharge as needed  - Refer to Case Management Department for coordinating discharge planning if the patient needs post-hospital services based on physician/advanced practitioner order or complex needs related to functional status, cognitive ability, or social support system  Outcome: Progressing     Problem: Knowledge Deficit  Goal: Patient/family/caregiver demonstrates understanding of disease process, treatment plan, medications, and discharge instructions  Description: Complete learning assessment and assess knowledge base    Interventions:  - Provide teaching at level of understanding  - Provide teaching via preferred learning methods  Outcome: Progressing     Problem: GASTROINTESTINAL - ADULT  Goal: Maintains or returns to baseline bowel function  Description: INTERVENTIONS:  - Assess bowel function  - Encourage oral fluids to ensure adequate hydration  - Administer IV fluids if ordered to ensure adequate hydration  - Administer ordered medications as needed  - Encourage mobilization and activity  - Consider nutritional services referral to assist patient with adequate nutrition and appropriate food choices  Outcome: Progressing  Goal: Maintains adequate nutritional intake  Description: INTERVENTIONS:  - Monitor percentage of each meal consumed  - Identify factors contributing to decreased intake, treat as appropriate  - Assist with meals as needed  - Monitor I&O, weight, and lab values if indicated  - Obtain nutrition services referral as needed  Outcome: Progressing     Problem: GENITOURINARY - ADULT  Goal: Absence of urinary retention  Description: INTERVENTIONS:  - Assess patient’s ability to void and empty bladder  - Monitor I/O  - Bladder scan as needed  - Discuss with physician/AP medications to alleviate retention as needed  - Discuss catheterization for long term situations as appropriate  Outcome: Progressing     Problem: METABOLIC, FLUID AND ELECTROLYTES - ADULT  Goal: Electrolytes maintained within normal limits  Description: INTERVENTIONS:  - Monitor labs and assess patient for signs and symptoms of electrolyte imbalances  - Administer electrolyte replacement as ordered  - Monitor response to electrolyte replacements, including repeat lab results as appropriate  - Instruct patient on fluid and nutrition as appropriate  Outcome: Progressing  Goal: Fluid balance maintained  Description: INTERVENTIONS:  - Monitor labs   - Monitor I/O and WT  - Instruct patient on fluid and nutrition as appropriate  - Assess for signs & symptoms of volume excess or deficit  Outcome: Progressing

## 2023-03-28 NOTE — PLAN OF CARE
Problem: PAIN - ADULT  Goal: Verbalizes/displays adequate comfort level or baseline comfort level  Description: Interventions:  - Encourage patient to monitor pain and request assistance  - Assess pain using appropriate pain scale (0-10 pain scale)  - Administer analgesics based on type and severity of pain and evaluate response  - Implement non-pharmacological measures as appropriate and evaluate response  - Consider cultural and social influences on pain and pain management  - Notify physician/advanced practitioner if interventions unsuccessful or patient reports new pain  Outcome: Progressing     Problem: INFECTION - ADULT  Goal: Absence or prevention of progression during hospitalization  Description: INTERVENTIONS:  - Assess and monitor for signs and symptoms of infection  - Monitor lab/diagnostic results  - Monitor all insertion sites, i e  indwelling lines  - Administer medications as ordered  - Instruct and encourage patient and family to use good hand hygiene technique  Outcome: Progressing     Problem: SAFETY ADULT  Goal: Patient will remain free of falls  Description: INTERVENTIONS:  - Educate patient/family on patient safety including physical limitations  - Instruct patient to call for assistance with activity (independent)  - Consult OT/PT to assist with strengthening/mobility   - Keep Call bell within reach  - Keep bed low and locked with side rails adjusted as appropriate  - Keep care items and personal belongings within reach  - Initiate and maintain comfort rounds  - Make Fall Risk Sign visible to staff (low fall risk)  - Offer Toileting every 1-2 Hours, in advance of need  - Obtain necessary fall risk management equipment: nonskid footwear, call bell within reach  Outcome: Progressing  Goal: Maintain or return to baseline ADL function  Description: INTERVENTIONS:  -  Assess patient's ability to carry out ADLs; (independent after setup))  - Assess/evaluate cause of self-care deficits (fatigue, diarrhea)  - Assess range of motion  - Assess patient's mobility; (independent)  - Assess patient's need for assistive devices and provide as appropriate  - Encourage maximum independence but intervene and supervise when necessary  - Assess for home care needs following discharge   - Consider OT consult to assist with ADL evaluation and planning for discharge  - Provide patient education as appropriate  Outcome: Progressing  Goal: Maintains/Returns to pre admission functional level  Description: INTERVENTIONS:  - Perform BMAT or MOVE assessment daily    - Set and communicate daily mobility goal to care team and patient/family/caregiver  - Collaborate with rehabilitation services on mobility goals if consulted  - Ambulate patient 3-5 times a day  - Out of bed to chair 3 times a day   - Out of bed for meals 3 times a day  - Out of bed for toileting  - Record patient progress and toleration of activity level   Outcome: Progressing     Problem: DISCHARGE PLANNING  Goal: Discharge to home or other facility with appropriate resources  Description: INTERVENTIONS:  - Identify barriers to discharge w/patient and caregiver  - Arrange for needed discharge resources and transportation as appropriate  - Identify discharge learning needs (meds, wound care, etc )  - Refer to Case Management Department for coordinating discharge planning if the patient needs post-hospital services based on physician/advanced practitioner order or complex needs related to functional status, cognitive ability, or social support system  Outcome: Progressing     Problem: Knowledge Deficit  Goal: Patient/family/caregiver demonstrates understanding of disease process, treatment plan, medications, and discharge instructions  Description: Complete learning assessment and assess knowledge base    Interventions:  - Provide teaching at level of understanding  - Provide teaching via preferred learning methods  Outcome: Progressing     Problem: GASTROINTESTINAL - ADULT  Goal: Maintains or returns to baseline bowel function  Description: INTERVENTIONS:  - Assess bowel function  - Encourage oral fluids to ensure adequate hydration  - Administer IV fluids if ordered to ensure adequate hydration  - Administer ordered medications as needed  - Encourage mobilization and activity  - Consider nutritional services referral to assist patient with adequate nutrition and appropriate food choices  Outcome: Progressing  Goal: Maintains adequate nutritional intake  Description: INTERVENTIONS:  - Monitor percentage of each meal consumed  - Identify factors contributing to decreased intake, treat as appropriate  - Assist with meals as needed  - Monitor I&O, weight, and lab values if indicated  - Obtain nutrition services referral as needed  Outcome: Progressing     Problem: GENITOURINARY - ADULT  Goal: Absence of urinary retention  Description: INTERVENTIONS:  - Assess patient's ability to void and empty bladder  - Monitor I/O  - Bladder scan as per urinary retention protocol  - Discuss with physician/AP medications to alleviate retention as needed  - Discuss catheterization for long term situations as appropriate  Outcome: Progressing     Problem: METABOLIC, FLUID AND ELECTROLYTES - ADULT  Goal: Electrolytes maintained within normal limits  Description: INTERVENTIONS:  - Monitor labs and assess patient for signs and symptoms of electrolyte imbalances  - Administer electrolyte replacement as ordered  - Monitor response to electrolyte replacements, including repeat lab results as appropriate  - Instruct patient on fluid and nutrition as appropriate  Outcome: Progressing  Goal: Fluid balance maintained  Description: INTERVENTIONS:  - Monitor labs   - Monitor I/O and WT  - Instruct patient on fluid and nutrition as appropriate  - Assess for signs & symptoms of volume excess or deficit  Outcome: Progressing

## 2023-03-28 NOTE — PROGRESS NOTES
[address]  Progress Note  Name: Muna Delacruz  MRN: [id number]  Unit/Bed#: 188-24 I Date of Admission: 3/27/2023   Date of Service: 3/28/2023 I Hospital Day: 1    Assessment/Plan   * HARRIS (acute kidney injury) ([facility] [address] )  Assessment & Plan  · Suspect volume loss in the setting of diarrhea  · Baseline Cr 0 9, now with a Cr of 2 82 on admission, down to 2 1 with IV fluid expansion  · Continue IV fluids  · Monitor renal function and urine output    Nausea, vomiting and diarrhea  Assessment & Plan  · Patient reported nausea, vomiting and diarrhea that started after 1 day of abx  · No longer taking abx, can hold off on any further abx  · PRN medications ordered for nausea  · Patient reports no further diarrhea, no stool studies were obtained  · Advance diet as tolerated  · We will add Florastor per patient's request    Primary hypertension  Assessment & Plan  · Hold lisinopril due to HARRIS  · Utilize hydralazine prn  · Avoid hypotension with HARRIS           VTE Pharmacologic Prophylaxis: VTE Score: 3 Moderate Risk (Score 3-4) - Pharmacological DVT Prophylaxis Ordered: heparin  Patient Centered Rounds: I performed bedside rounds with nursing staff today  Discussions with Specialists or Other Care Team Provider: Multidisciplinary meeting    Education and Discussions with Family / Patient: patient    Total Time Spent on Date of Encounter in care of patient: 35 minutes This time was spent on one or more of the following: performing physical exam; counseling and coordination of care; obtaining or reviewing history; documenting in the medical record; reviewing/ordering tests, medications or procedures; communicating with other healthcare professionals and discussing with patient's family/caregivers      Current Length of Stay: 1 day(s)  Current Patient Status: Inpatient   Certification Statement: The patient will continue to require additional inpatient hospital stay due to IV Rx  Discharge Plan: Anticipate discharge in 24-48 hrs to home  Code Status: Level 1 - Full Code    Subjective:   Patient seen and examined  She is still having some nausea but would like to have her diet advanced to see if she can tolerate  Having a mild headache  Objective:     Vitals:   Temp (24hrs), Av 6 °F (37 °C), Min:98 1 °F (36 7 °C), Max:99 2 °F (37 3 °C)    Temp:  [98 1 °F (36 7 °C)-99 2 °F (37 3 °C)] 98 1 °F (36 7 °C)  HR:  [50-60] 50  Resp:  [18-20] 18  BP: (104-158)/(58-71) 128/62  SpO2:  [91 %-97 %] 96 %  Body mass index is 34 09 kg/m²  Input and Output Summary (last 24 hours): Intake/Output Summary (Last 24 hours) at 3/28/2023 1629  Last data filed at 3/28/2023 1124  Gross per 24 hour   Intake 1820 ml   Output 1250 ml   Net 570 ml       Physical Exam:   Physical Exam  Constitutional:       General: She is not in acute distress  HENT:      Head: Normocephalic and atraumatic  Nose: No congestion  Eyes:      Conjunctiva/sclera: Conjunctivae normal    Cardiovascular:      Rate and Rhythm: Normal rate and regular rhythm  Heart sounds: No murmur heard  Pulmonary:      Effort: No respiratory distress  Breath sounds: No wheezing or rales  Abdominal:      General: Bowel sounds are normal  There is no distension  Palpations: Abdomen is soft  Tenderness: There is no abdominal tenderness  There is no guarding  Musculoskeletal:      Right lower leg: No edema  Left lower leg: No edema  Skin:     General: Skin is warm and dry  Neurological:      Mental Status: She is oriented to person, place, and time     Psychiatric:         Mood and Affect: Mood normal           Additional Data:     Labs:  Results from last 7 days   Lab Units 23  0450   WBC Thousand/uL 5 20   HEMOGLOBIN g/dL 11 8   HEMATOCRIT % 36 6   PLATELETS Thousands/uL 197   NEUTROS PCT % 53   LYMPHS PCT % 28   MONOS PCT % 12   EOS PCT % 5     Results from last 7 days   Lab Units 23  0450   SODIUM mmol/L 140   POTASSIUM mmol/L 4 3   CHLORIDE mmol/L 107   CO2 mmol/L 25   BUN mg/dL 23   CREATININE mg/dL 2 16*   ANION GAP mmol/L 8   CALCIUM mg/dL 8 6   ALBUMIN g/dL 3 4*   TOTAL BILIRUBIN mg/dL 0 38   ALK PHOS U/L 52   ALT U/L 11   AST U/L 15   GLUCOSE RANDOM mg/dL 87                       Lines/Drains:  Invasive Devices     Peripheral Intravenous Line  Duration           Peripheral IV 03/27/23 Dorsal (posterior); Right Hand <1 day                  Imaging: awaiting US KUB    Recent Cultures (last 7 days):         Last 24 Hours Medication List:   Current Facility-Administered Medications   Medication Dose Route Frequency Provider Last Rate   • acetaminophen  650 mg Oral Q6H PRN Froylan Plain, PA-C     • aluminum-magnesium hydroxide-simethicone  30 mL Oral Q6H PRN Froylan Plain, PA-C     • citalopram  20 mg Oral Daily Froylan Plain, PA-C     • heparin (porcine)  5,000 Units Subcutaneous [facility] Froylan Plain, PA-C     • hydrALAZINE  5 mg Intravenous Q6H PRN Froylan Plain, PA-C     • ondansetron  4 mg Intravenous Q6H PRN Froylan Plain, PA-C     • saccharomyces boulardii  250 mg Oral BID Dulce Ivy MD     • sodium chloride  75 mL/hr Intravenous Continuous Froylan Plain, PA-C 75 mL/hr (03/28/23 1023)        Today, Patient Was Seen By: Pastor Mccollum MD    **Please Note: This note may have been constructed using a voice recognition system  ** Perineural Invasion (For Histology - Be Specific If Possible): absent

## 2023-03-29 VITALS
SYSTOLIC BLOOD PRESSURE: 127 MMHG | TEMPERATURE: 98.8 F | DIASTOLIC BLOOD PRESSURE: 68 MMHG | WEIGHT: 212 LBS | HEART RATE: 54 BPM | BODY MASS INDEX: 34.07 KG/M2 | HEIGHT: 66 IN | OXYGEN SATURATION: 95 % | RESPIRATION RATE: 18 BRPM

## 2023-03-29 LAB
ANION GAP SERPL CALCULATED.3IONS-SCNC: 6 MMOL/L (ref 4–13)
BASOPHILS # BLD AUTO: 0.05 THOUSANDS/ÂΜL (ref 0–0.1)
BASOPHILS NFR BLD AUTO: 1 % (ref 0–1)
BUN SERPL-MCNC: 19 MG/DL (ref 5–25)
CALCIUM SERPL-MCNC: 8.7 MG/DL (ref 8.4–10.2)
CHLORIDE SERPL-SCNC: 107 MMOL/L (ref 96–108)
CO2 SERPL-SCNC: 27 MMOL/L (ref 21–32)
CREAT SERPL-MCNC: 1.45 MG/DL (ref 0.6–1.3)
EOSINOPHIL # BLD AUTO: 0.25 THOUSAND/ÂΜL (ref 0–0.61)
EOSINOPHIL NFR BLD AUTO: 5 % (ref 0–6)
ERYTHROCYTE [DISTWIDTH] IN BLOOD BY AUTOMATED COUNT: 12.3 % (ref 11.6–15.1)
GFR SERPL CREATININE-BSD FRML MDRD: 35 ML/MIN/1.73SQ M
GLUCOSE SERPL-MCNC: 85 MG/DL (ref 65–140)
HCT VFR BLD AUTO: 36.9 % (ref 34.8–46.1)
HGB BLD-MCNC: 12 G/DL (ref 11.5–15.4)
IMM GRANULOCYTES # BLD AUTO: 0.03 THOUSAND/UL (ref 0–0.2)
IMM GRANULOCYTES NFR BLD AUTO: 1 % (ref 0–2)
LYMPHOCYTES # BLD AUTO: 1.87 THOUSANDS/ÂΜL (ref 0.6–4.47)
LYMPHOCYTES NFR BLD AUTO: 36 % (ref 14–44)
MCH RBC QN AUTO: 29.8 PG (ref 26.8–34.3)
MCHC RBC AUTO-ENTMCNC: 32.5 G/DL (ref 31.4–37.4)
MCV RBC AUTO: 92 FL (ref 82–98)
MONOCYTES # BLD AUTO: 0.51 THOUSAND/ÂΜL (ref 0.17–1.22)
MONOCYTES NFR BLD AUTO: 10 % (ref 4–12)
NEUTROPHILS # BLD AUTO: 2.5 THOUSANDS/ÂΜL (ref 1.85–7.62)
NEUTS SEG NFR BLD AUTO: 47 % (ref 43–75)
NRBC BLD AUTO-RTO: 0 /100 WBCS
PLATELET # BLD AUTO: 208 THOUSANDS/UL (ref 149–390)
PMV BLD AUTO: 10.4 FL (ref 8.9–12.7)
POTASSIUM SERPL-SCNC: 3.9 MMOL/L (ref 3.5–5.3)
RBC # BLD AUTO: 4.03 MILLION/UL (ref 3.81–5.12)
SODIUM SERPL-SCNC: 140 MMOL/L (ref 135–147)
WBC # BLD AUTO: 5.21 THOUSAND/UL (ref 4.31–10.16)

## 2023-03-29 RX ORDER — SACCHAROMYCES BOULARDII 250 MG
250 CAPSULE ORAL 2 TIMES DAILY
Qty: 14 CAPSULE | Refills: 0 | Status: SHIPPED | OUTPATIENT
Start: 2023-03-29

## 2023-03-29 RX ADMIN — CITALOPRAM HYDROBROMIDE 20 MG: 20 TABLET ORAL at 09:05

## 2023-03-29 RX ADMIN — HEPARIN SODIUM 5000 UNITS: 5000 INJECTION INTRAVENOUS; SUBCUTANEOUS at 05:05

## 2023-03-29 RX ADMIN — Medication 250 MG: at 09:05

## 2023-03-29 NOTE — ASSESSMENT & PLAN NOTE
· Patient reported nausea, vomiting and diarrhea that started after 1 day of antibiotics  · Symptoms have not resolved  · Patient reports no further diarrhea, no stool studies were obtained  · Tolerating soft diet  · Appropriate for discharge

## 2023-03-29 NOTE — ASSESSMENT & PLAN NOTE
· Suspect volume loss in the setting of diarrhea  · Baseline Cr 0 9, now with a Cr of 2 82 on admission, down to 1 45 with IV fluid expansion  · Patient with adequate oral intake, appropriate for discharge, and sure to continue adequate oral fluid hydration  · Obtain BMP and of this week or early next week  · Follow-up with PCP in 1 week

## 2023-03-29 NOTE — NURSING NOTE
Patient left floor via ambulation in stable condition  Discharge instructions reviewed with verbal understanding obtained

## 2023-03-29 NOTE — CASE MANAGEMENT
Case Management Discharge Planning Note    Patient name Noe Goodell  Location [address] [id number] MRN [id number]  : 1950 Date 3/29/2023       Current Admission Date: 3/27/2023  Current Admission Diagnosis:HARRIS (acute kidney injury) [facility])   Patient Active Problem List    Diagnosis Date Noted   • HARRIS (acute kidney injury) ([facility] [address] ) 2023   • Primary hypertension 2023   • Nausea, vomiting and diarrhea 2023      LOS (days): 2  Geometric Mean LOS (GMLOS) (days): 2 20  Days to GMLOS:0 2     OBJECTIVE:  Risk of Unplanned Readmission Score: 8 37         Current admission status: Inpatient   Preferred Pharmacy:   [address] [place] [address], [address] [place] [id number]  Phone: [phone] Fax: [phone]    [facility]/pharmacy [address] [address]  Phone: [phone] Fax: [phone]    Primary Care Provider: Asim Hernandez MD    Primary Insurance: MEDICARE  Secondary Insurance: AARP    DISCHARGE DETAILS:    Discharge planning discussed with[de-identified] Pt  Freedom of Choice: Yes     CM contacted family/caregiver?: No- see comments (Pt is alert and oriented x3)  Were Treatment Team discharge recommendations reviewed with patient/caregiver?: Yes  Did patient/caregiver verbalize understanding of patient care needs?: Yes  Were patient/caregiver advised of the risks associated with not following Treatment Team discharge recommendations?: Yes         [address] [place] [address] Road         Is the patient interested in [facility] at discharge?: No    DME Referral Provided  Referral made for DME?: No              Treatment Team Recommendation: Home  Discharge Destination Plan[de-identified] Home  Transport at Discharge : Automobile, Family        Discussed with patient preferences on discharge : Understanding how to manage health at home , purpose of taking meds, importance of follow up appointments and symptoms to watch out for   Nurse to review AVS with patient  All follow up providers listed   Family  To transport the patient home

## 2023-03-29 NOTE — PLAN OF CARE
Problem: PAIN - ADULT  Goal: Verbalizes/displays adequate comfort level or baseline comfort level  Description: Interventions:  - Encourage patient to monitor pain and request assistance  - Assess pain using appropriate pain scale (0-10 pain scale)  - Administer analgesics based on type and severity of pain and evaluate response  - Implement non-pharmacological measures as appropriate and evaluate response  - Consider cultural and social influences on pain and pain management  - Notify physician/advanced practitioner if interventions unsuccessful or patient reports new pain  3/29/2023 1140 by Nasir Rouse LPN  Outcome: Adequate for Discharge  3/29/2023 0730 by Nasir Rouse LPN  Outcome: Progressing     Problem: INFECTION - ADULT  Goal: Absence or prevention of progression during hospitalization  Description: INTERVENTIONS:  - Assess and monitor for signs and symptoms of infection  - Monitor lab/diagnostic results  - Monitor all insertion sites, i e  indwelling lines  - Administer medications as ordered  - Instruct and encourage patient and family to use good hand hygiene technique  3/29/2023 1140 by Nasir Rouse LPN  Outcome: Adequate for Discharge  3/29/2023 0730 by Nasir Rouse LPN  Outcome: Progressing     Problem: SAFETY ADULT  Goal: Patient will remain free of falls  Description: INTERVENTIONS:  - Educate patient/family on patient safety including physical limitations  - Instruct patient to call for assistance with activity (independent)  - Consult OT/PT to assist with strengthening/mobility   - Keep Call bell within reach  - Keep bed low and locked with side rails adjusted as appropriate  - Keep care items and personal belongings within reach  - Initiate and maintain comfort rounds  - Make Fall Risk Sign visible to staff (low fall risk)  - Offer Toileting every 1-2 Hours, in advance of need  - Obtain necessary fall risk management equipment: nonskid footwear, call bell within reach  3/29/2023 1140 by Enedina Low LPN  Outcome: Adequate for Discharge  3/29/2023 0730 by Enedina Low LPN  Outcome: Progressing  Goal: Maintain or return to baseline ADL function  Description: INTERVENTIONS:  -  Assess patient's ability to carry out ADLs; (independent after setup))  - Assess/evaluate cause of self-care deficits (fatigue, diarrhea)  - Assess range of motion  - Assess patient's mobility; (independent)  - Assess patient's need for assistive devices and provide as appropriate  - Encourage maximum independence but intervene and supervise when necessary  - Assess for home care needs following discharge   - Consider OT consult to assist with ADL evaluation and planning for discharge  - Provide patient education as appropriate  3/29/2023 1140 by Enedina Low LPN  Outcome: Adequate for Discharge  3/29/2023 0730 by Enedina Low LPN  Outcome: Progressing  Goal: Maintains/Returns to pre admission functional level  Description: INTERVENTIONS:  - Perform BMAT or MOVE assessment daily    - Set and communicate daily mobility goal to care team and patient/family/caregiver     - Collaborate with rehabilitation services on mobility goals if consulted  - Ambulate patient 3-5 times a day  - Out of bed to chair 3 times a day   - Out of bed for meals 3 times a day  - Out of bed for toileting  - Record patient progress and toleration of activity level   3/29/2023 1140 by Enedina Low LPN  Outcome: Adequate for Discharge  3/29/2023 0730 by Enedina Low LPN  Outcome: Progressing     Problem: DISCHARGE PLANNING  Goal: Discharge to home or other facility with appropriate resources  Description: INTERVENTIONS:  - Identify barriers to discharge w/patient and caregiver  - Arrange for needed discharge resources and transportation as appropriate  - Identify discharge learning needs (meds, wound care, etc )  - Refer to Case Management Department for coordinating discharge planning if the patient needs post-hospital services based on physician/advanced practitioner order or complex needs related to functional status, cognitive ability, or social support system  3/29/2023 1140 by Radha Bloom LPN  Outcome: Adequate for Discharge  3/29/2023 0730 by Radha Bloom LPN  Outcome: Progressing     Problem: Knowledge Deficit  Goal: Patient/family/caregiver demonstrates understanding of disease process, treatment plan, medications, and discharge instructions  Description: Complete learning assessment and assess knowledge base    Interventions:  - Provide teaching at level of understanding  - Provide teaching via preferred learning methods  3/29/2023 1140 by Radha Bloom LPN  Outcome: Adequate for Discharge  3/29/2023 0730 by Radha Bloom LPN  Outcome: Progressing     Problem: GASTROINTESTINAL - ADULT  Goal: Maintains or returns to baseline bowel function  Description: INTERVENTIONS:  - Assess bowel function  - Encourage oral fluids to ensure adequate hydration  - Administer IV fluids if ordered to ensure adequate hydration  - Administer ordered medications as needed  - Encourage mobilization and activity  - Consider nutritional services referral to assist patient with adequate nutrition and appropriate food choices  3/29/2023 1140 by Radha Bloom LPN  Outcome: Adequate for Discharge  3/29/2023 0730 by Radha Bloom LPN  Outcome: Progressing  Goal: Maintains adequate nutritional intake  Description: INTERVENTIONS:  - Monitor percentage of each meal consumed  - Identify factors contributing to decreased intake, treat as appropriate  - Assist with meals as needed  - Monitor I&O, weight, and lab values if indicated  - Obtain nutrition services referral as needed  3/29/2023 1140 by Radha Bloom LPN  Outcome: Adequate for Discharge  3/29/2023 0730 by Radha Bloom LPN  Outcome: Progressing     Problem: GENITOURINARY - ADULT  Goal: Absence of urinary retention  Description: INTERVENTIONS:  - Assess patient's ability to void and empty bladder  - Monitor I/O  - Bladder scan as per urinary retention protocol  - Discuss with physician/AP medications to alleviate retention as needed  - Discuss catheterization for long term situations as appropriate  3/29/2023 1140 by Pham Bee LPN  Outcome: Adequate for Discharge  3/29/2023 0730 by Pham Bee LPN  Outcome: Progressing     Problem: METABOLIC, FLUID AND ELECTROLYTES - ADULT  Goal: Electrolytes maintained within normal limits  Description: INTERVENTIONS:  - Monitor labs and assess patient for signs and symptoms of electrolyte imbalances  - Administer electrolyte replacement as ordered  - Monitor response to electrolyte replacements, including repeat lab results as appropriate  - Instruct patient on fluid and nutrition as appropriate  3/29/2023 1140 by Pham Bee LPN  Outcome: Adequate for Discharge  3/29/2023 0730 by Pham Bee LPN  Outcome: Progressing  Goal: Fluid balance maintained  Description: INTERVENTIONS:  - Monitor labs   - Monitor I/O and WT  - Instruct patient on fluid and nutrition as appropriate  - Assess for signs & symptoms of volume excess or deficit  3/29/2023 1140 by Pahm Bee LPN  Outcome: Adequate for Discharge  3/29/2023 0730 by Pham Bee LPN  Outcome: Progressing

## 2023-03-29 NOTE — DISCHARGE SUMMARY
[address]  Discharge- Naeem Been 1950, 68 y o  female MRN: [id number]  Unit/Bed#: 423-01 Encounter: [id number]  Primary Care Provider: Harvey Atkinson MD   Date and time admitted to hospital: 3/27/2023 10:05 AM    * HARRIS (acute kidney injury) ([facility] [address] )  Assessment & Plan  · Suspect volume loss in the setting of diarrhea  · Baseline Cr 0 9, now with a Cr of 2 82 on admission, down to 1 45 with IV fluid expansion  · Patient with adequate oral intake, appropriate for discharge, and sure to continue adequate oral fluid hydration  · Obtain BMP and of this week or early next week  · Follow-up with PCP in 1 week    Nausea, vomiting and diarrhea  Assessment & Plan  · Patient reported nausea, vomiting and diarrhea that started after 1 day of antibiotics  · Symptoms have not resolved  · Patient reports no further diarrhea, no stool studies were obtained  · Tolerating soft diet  · Appropriate for discharge    Primary hypertension  Assessment & Plan  · Can resume HCTZ  · Continue to hold lisinopril until creatinine returns to baseline        Medical Problems     Resolved Problems  Date Reviewed: 3/29/2023   None       Discharging Physician / Practitioner: Krystle Malin MD  PCP: Harvey Atkinson MD  Admission Date:   Admission Orders (From admission, onward)     Ordered        03/27/23 1204  INPATIENT ADMISSION  Once                      Discharge Date: 03/29/23    Consultations During Hospital Stay:  · None    Procedures Performed:   US kidney and bladder   Final Result by Reena Rodriguez MD (03/29 0603)      No hydronephrosis               Workstation performed: [id number]               Significant Findings / Test Results:   Results from last 7 days   Lab Units 03/29/23  0456   WBC Thousand/uL 5 21   HEMOGLOBIN g/dL 12 0   HEMATOCRIT % 36 9   PLATELETS Thousands/uL 208     Results from last 7 days   Lab Units 03/29/23  0456   SODIUM mmol/L 140   POTASSIUM mmol/L 3 9   CHLORIDE mmol/L 107   CO2 "mmol/L 27   BUN mg/dL 19   CREATININE mg/dL 1 45*   CALCIUM mg/dL 8 7       Test Results Pending at Discharge (will require follow up): · None     Outpatient Tests Requested:  · BMP    Complications:  None    Reason for Admission: nausea    Hospital Course:   Bryn Loya is a 68 y o  female patient who originally presented to the hospital on 3/27/2023 due to diarrhea, vomiting  Found to have acute kidney injury due to prerenal etiology, with significant improvement with volume expansion  Patient's initial symptoms improved and she was appropriate for discharge  She is to repeat BMP at the end of this week versus beginning of next week  To follow-up with PCP  Plan of care discussed, all questions answered  Please see above list of diagnoses and related plan for additional information  Condition at Discharge: good    Discharge Day Visit / Exam:   Subjective: Patient seen and examined  She reports feeling well and would like to be discharged home  No overnight events  Vitals: Blood Pressure: 127/68 (03/29/23 0803)  Pulse: (!) 54 (03/29/23 0803)  Temperature: 98 8 °F (37 1 °C) (03/29/23 0803)  Temp Source: Tympanic (03/28/23 1504)  Respirations: 18 (03/29/23 0803)  Height: 5' 6\" (167 6 cm) (03/27/23 1836)  Weight - Scale: 96 2 kg (212 lb) (03/29/23 0600)  SpO2: 95 % (03/29/23 0803)  Exam:   Physical Exam  Constitutional:       General: She is not in acute distress  HENT:      Head: Normocephalic and atraumatic  Nose: No congestion  Eyes:      Conjunctiva/sclera: Conjunctivae normal    Cardiovascular:      Rate and Rhythm: Normal rate and regular rhythm  Pulmonary:      Effort: No respiratory distress  Breath sounds: No wheezing or rales  Abdominal:      General: There is no distension  Tenderness: There is no abdominal tenderness  There is no guarding  Musculoskeletal:      Right lower leg: No edema  Left lower leg: No edema     Skin:     General: Skin is warm and " dry    Neurological:      Mental Status: She is oriented to person, place, and time  Psychiatric:         Mood and Affect: Mood normal           Discharge instructions/Information to patient and family:   See after visit summary for information provided to patient and family  Provisions for Follow-Up Care:  See after visit summary for information related to follow-up care and any pertinent home health orders  Disposition:   Home    Planned Readmission: No     Discharge Statement:  I spent 35 minutes discharging the patient  This time was spent on the day of discharge  I had direct contact with the patient on the day of discharge  Greater than 50% of the total time was spent examining patient, answering all patient questions, arranging and discussing plan of care with patient as well as directly providing post-discharge instructions  Additional time then spent on discharge activities  Discharge Medications:  See after visit summary for reconciled discharge medications provided to patient and/or family        **Please Note: This note may have been constructed using a voice recognition system**

## 2023-04-01 ENCOUNTER — APPOINTMENT (OUTPATIENT)
Dept: LAB | Facility: MEDICAL CENTER | Age: 73
End: 2023-04-01

## 2023-04-01 DIAGNOSIS — N17.9 AKI (ACUTE KIDNEY INJURY) (HCC): ICD-10-CM

## 2023-04-01 LAB
ANION GAP SERPL CALCULATED.3IONS-SCNC: 1 MMOL/L (ref 4–13)
BUN SERPL-MCNC: 14 MG/DL (ref 5–25)
CALCIUM SERPL-MCNC: 9 MG/DL (ref 8.3–10.1)
CHLORIDE SERPL-SCNC: 111 MMOL/L (ref 96–108)
CO2 SERPL-SCNC: 27 MMOL/L (ref 21–32)
CREAT SERPL-MCNC: 0.96 MG/DL (ref 0.6–1.3)
GFR SERPL CREATININE-BSD FRML MDRD: 58 ML/MIN/1.73SQ M
GLUCOSE P FAST SERPL-MCNC: 76 MG/DL (ref 65–99)
POTASSIUM SERPL-SCNC: 3.9 MMOL/L (ref 3.5–5.3)
SODIUM SERPL-SCNC: 139 MMOL/L (ref 135–147)

## 2023-04-06 ENCOUNTER — OFFICE VISIT (OUTPATIENT)
Dept: SURGERY | Facility: CLINIC | Age: 73
End: 2023-04-06

## 2023-04-06 VITALS
TEMPERATURE: 97.6 F | WEIGHT: 208 LBS | DIASTOLIC BLOOD PRESSURE: 66 MMHG | HEART RATE: 58 BPM | OXYGEN SATURATION: 97 % | BODY MASS INDEX: 33.43 KG/M2 | HEIGHT: 66 IN | SYSTOLIC BLOOD PRESSURE: 124 MMHG

## 2023-04-06 DIAGNOSIS — R19.7 NAUSEA, VOMITING AND DIARRHEA: ICD-10-CM

## 2023-04-06 DIAGNOSIS — R11.2 NAUSEA, VOMITING AND DIARRHEA: ICD-10-CM

## 2023-04-06 DIAGNOSIS — N17.9 AKI (ACUTE KIDNEY INJURY) (HCC): ICD-10-CM

## 2023-04-06 DIAGNOSIS — K43.2 VENTRAL INCISIONAL HERNIA WITHOUT OBSTRUCTION OR GANGRENE: Primary | ICD-10-CM

## 2023-04-06 RX ORDER — ONDANSETRON 2 MG/ML
4 INJECTION INTRAMUSCULAR; INTRAVENOUS ONCE
OUTPATIENT
Start: 2023-04-06 | End: 2023-04-06

## 2023-04-06 RX ORDER — HEPARIN SODIUM 5000 [USP'U]/ML
5000 INJECTION, SOLUTION INTRAVENOUS; SUBCUTANEOUS ONCE
OUTPATIENT
Start: 2023-04-06 | End: 2023-04-06

## 2023-04-06 RX ORDER — LISINOPRIL 40 MG/1
40 TABLET ORAL DAILY
COMMUNITY

## 2023-04-06 RX ORDER — SODIUM CHLORIDE, SODIUM LACTATE, POTASSIUM CHLORIDE, CALCIUM CHLORIDE 600; 310; 30; 20 MG/100ML; MG/100ML; MG/100ML; MG/100ML
125 INJECTION, SOLUTION INTRAVENOUS CONTINUOUS
OUTPATIENT
Start: 2023-04-06

## 2023-04-06 RX ORDER — CHLORHEXIDINE GLUCONATE 4 G/100ML
SOLUTION TOPICAL DAILY PRN
OUTPATIENT
Start: 2023-04-06

## 2023-04-06 NOTE — PROGRESS NOTES
Office Visit - General Surgery  Naeem Han MRN: [id number]  Encounter: [id number]    Assessment and Plan  Problem List Items Addressed This Visit        Digestive    Nausea, vomiting and diarrhea       Genitourinary    HARRIS (acute kidney injury) ([facility] [address] )   Other Visit Diagnoses     Ventral incisional hernia without obstruction or gangrene    -  Primary        Hernia, left Spigelian (Ventral)    -currently reducible  -imaging reviewed extensively, may be amenable to repair, given low position may require primary repair vs mesh  -elective repair feasible, would proceed in a laparoscopic fashion  -avoid heavy lifting  -monitor for sings and symptoms of incarceration, obstruction, and/r   strangulation       HARRIS (resolved)  -no specific concerns at present  -Check BMP in 2 weeks   -continue hydration  -medical follow-up as scheduled  -avoid nephrotoxic insults  -not a barrier to operation at this time    Nausea/vomiting/diarrhea, now resolved  -thought to be secondary to prior diverticulitis assoc abx regimen  -no specific or ongoing management required at this time     Chief Complaint:  Naeem Han is a 68 y o  female who presents for evaluation of a newly diagnosed hernia  Subjective  Recently suffered a bout of LLQ/inguinal pain, mistaken for diverticulitis given Hx, received abx, had bad nausea and vomiting culminating in dehydration/severe HARRIS, now released from hospital with improving renal function, notes no obstructive symptoms  No hernia site pain at present, reports bedside reduction performed by her GI physician  No fevers,c hills, no prior knowledge of hernia  Does not regularly engage in heavy lifting  Past Medical History:   Diagnosis Date   • Hypertension    • Psychiatric disorder     depression       Past Surgical History:   Procedure Laterality Date   • APPENDECTOMY     • CHOLECYSTECTOMY     • HYSTERECTOMY         No family history on file      Social History     Tobacco Use   • Smoking status: Never   • Smokeless tobacco: Never   Vaping Use   • Vaping Use: Never used   Substance Use Topics   • Alcohol use: Not Currently   • Drug use: Never        Medications  Current Outpatient Medications on File Prior to Visit   Medication Sig Dispense Refill   • citalopram (CeleXA) 20 mg tablet Take 20 mg by mouth daily     • hydrochlorothiazide (HYDRODIURIL) 12 5 mg tablet Take 12 5 mg by mouth daily     • lisinopril (ZESTRIL) 40 mg tablet Take 40 mg by mouth daily     • ondansetron (ZOFRAN) 4 mg tablet Take 1 tablet (4 mg total) by mouth every 8 (eight) hours as needed for nausea (Patient not taking: Reported on 4/6/2023) 20 tablet 0   • saccharomyces boulardii (FLORASTOR) 250 mg capsule Take 1 capsule (250 mg total) by mouth 2 (two) times a day (Patient not taking: Reported on 4/6/2023) 14 capsule 0     No current facility-administered medications on file prior to visit  Allergies  No Known Allergies    Review of Systems   Gastrointestinal: Positive for abdominal pain  All other systems reviewed and are negative  Objective  Vitals:    04/06/23 1431   BP: 124/66   Pulse: 58   Temp: 97 6 °F (36 4 °C)   SpO2: 97%       Physical Exam  Vitals reviewed  Constitutional:       General: She is not in acute distress  Appearance: She is obese  She is not ill-appearing  HENT:      Head: Normocephalic and atraumatic  Mouth/Throat:      Mouth: Mucous membranes are moist    Eyes:      General: No scleral icterus  Pupils: Pupils are equal, round, and reactive to light  Cardiovascular:      Rate and Rhythm: Normal rate and regular rhythm  Pulmonary:      Effort: Pulmonary effort is normal  No respiratory distress  Abdominal:      General: A surgical scar is present  There is no distension  Palpations: Abdomen is soft  Tenderness: There is no abdominal tenderness  Hernia: A hernia is present  Hernia is present in the ventral area     Musculoskeletal:         General: No swelling, tenderness or deformity  Cervical back: Normal range of motion  Lymphadenopathy:      Cervical: No cervical adenopathy  Skin:     General: Skin is warm and dry  Capillary Refill: Capillary refill takes 2 to 3 seconds  Neurological:      General: No focal deficit present  Mental Status: She is alert and oriented to person, place, and time  Mental status is at baseline     Psychiatric:         Mood and Affect: Mood normal          Behavior: Behavior normal

## 2023-04-06 NOTE — H&P (VIEW-ONLY)
H&P - General Surgery  Melanie Mandel MRN: [id number]  Encounter: [id number]    Assessment and Plan  Problem List Items Addressed This Visit        Digestive    Nausea, vomiting and diarrhea       Genitourinary    HARRIS (acute kidney injury) ([facility] [address] )   Other Visit Diagnoses     Ventral incisional hernia without obstruction or gangrene    -  Primary        Hernia, left Spigelian (Ventral)    -currently reducible  -imaging reviewed extensively, may be amenable to repair, given low position may require primary repair vs mesh  -elective repair feasible, would proceed in a laparoscopic fashion  -avoid heavy lifting  -monitor for sings and symptoms of incarceration, obstruction, and/r   strangulation       HARRIS (resolved)  -no specific concerns at present  -Check BMP in 2 weeks   -continue hydration  -medical follow-up as scheduled  -avoid nephrotoxic insults  -not a barrier to operation at this time    Nausea/vomiting/diarrhea, now resolved  -thought to be secondary to prior diverticulitis assoc abx regimen  -no specific or ongoing management required at this time     Chief Complaint:  Melanie Mandel is a 68 y o  female who presents for evaluation of a newly diagnosed hernia  Subjective  Recently suffered a bout of LLQ/inguinal pain, mistaken for diverticulitis given Hx, received abx, had bad nausea and vomiting culminating in dehydration/severe HARRIS, now released from hospital with improving renal function, notes no obstructive symptoms  No hernia site pain at present, reports bedside reduction performed by her GI physician  No fevers,c hills, no prior knowledge of hernia  Does not regularly engage in heavy lifting  Past Medical History:   Diagnosis Date    Hypertension     Psychiatric disorder     depression       Past Surgical History:   Procedure Laterality Date    APPENDECTOMY      CHOLECYSTECTOMY      HYSTERECTOMY         No family history on file      Social History     Tobacco Use    Smoking status: Never    Smokeless tobacco: Never   Vaping Use    Vaping Use: Never used   Substance Use Topics    Alcohol use: Not Currently    Drug use: Never        Medications  Current Outpatient Medications on File Prior to Visit   Medication Sig Dispense Refill    citalopram (CeleXA) 20 mg tablet Take 20 mg by mouth daily      hydrochlorothiazide (HYDRODIURIL) 12 5 mg tablet Take 12 5 mg by mouth daily      lisinopril (ZESTRIL) 40 mg tablet Take 40 mg by mouth daily      ondansetron (ZOFRAN) 4 mg tablet Take 1 tablet (4 mg total) by mouth every 8 (eight) hours as needed for nausea (Patient not taking: Reported on 4/6/2023) 20 tablet 0    saccharomyces boulardii (FLORASTOR) 250 mg capsule Take 1 capsule (250 mg total) by mouth 2 (two) times a day (Patient not taking: Reported on 4/6/2023) 14 capsule 0     No current facility-administered medications on file prior to visit  Allergies  No Known Allergies    Review of Systems   Gastrointestinal: Positive for abdominal pain  All other systems reviewed and are negative  Objective  Vitals:    04/06/23 1431   BP: 124/66   Pulse: 58   Temp: 97 6 °F (36 4 °C)   SpO2: 97%       Physical Exam  Vitals reviewed  Constitutional:       General: She is not in acute distress  Appearance: She is obese  She is not ill-appearing  HENT:      Head: Normocephalic and atraumatic  Mouth/Throat:      Mouth: Mucous membranes are moist    Eyes:      General: No scleral icterus  Pupils: Pupils are equal, round, and reactive to light  Cardiovascular:      Rate and Rhythm: Normal rate and regular rhythm  Pulmonary:      Effort: Pulmonary effort is normal  No respiratory distress  Abdominal:      General: A surgical scar is present  There is no distension  Palpations: Abdomen is soft  Tenderness: There is no abdominal tenderness  Hernia: A hernia is present  Hernia is present in the ventral area     Musculoskeletal:         General: No swelling, tenderness or deformity  Cervical back: Normal range of motion  Lymphadenopathy:      Cervical: No cervical adenopathy  Skin:     General: Skin is warm and dry  Capillary Refill: Capillary refill takes 2 to 3 seconds  Neurological:      General: No focal deficit present  Mental Status: She is alert and oriented to person, place, and time  Mental status is at baseline     Psychiatric:         Mood and Affect: Mood normal          Behavior: Behavior normal

## 2023-04-06 NOTE — H&P
H&P - General Surgery  Naeem Han MRN: [id number]  Encounter: [id number]    Assessment and Plan  Problem List Items Addressed This Visit        Digestive    Nausea, vomiting and diarrhea       Genitourinary    HARRIS (acute kidney injury) ([facility] [address] )   Other Visit Diagnoses     Ventral incisional hernia without obstruction or gangrene    -  Primary        Hernia, left Spigelian (Ventral)    -currently reducible  -imaging reviewed extensively, may be amenable to repair, given low position may require primary repair vs mesh  -elective repair feasible, would proceed in a laparoscopic fashion  -avoid heavy lifting  -monitor for sings and symptoms of incarceration, obstruction, and/r   strangulation       HARRIS (resolved)  -no specific concerns at present  -Check BMP in 2 weeks   -continue hydration  -medical follow-up as scheduled  -avoid nephrotoxic insults  -not a barrier to operation at this time    Nausea/vomiting/diarrhea, now resolved  -thought to be secondary to prior diverticulitis assoc abx regimen  -no specific or ongoing management required at this time     Chief Complaint:  Naeem Han is a 68 y o  female who presents for evaluation of a newly diagnosed hernia  Subjective  Recently suffered a bout of LLQ/inguinal pain, mistaken for diverticulitis given Hx, received abx, had bad nausea and vomiting culminating in dehydration/severe HARRIS, now released from hospital with improving renal function, notes no obstructive symptoms  No hernia site pain at present, reports bedside reduction performed by her GI physician  No fevers,c hills, no prior knowledge of hernia  Does not regularly engage in heavy lifting  Past Medical History:   Diagnosis Date   • Hypertension    • Psychiatric disorder     depression       Past Surgical History:   Procedure Laterality Date   • APPENDECTOMY     • CHOLECYSTECTOMY     • HYSTERECTOMY         No family history on file      Social History     Tobacco Use   • Smoking status: Never   • Smokeless tobacco: Never   Vaping Use   • Vaping Use: Never used   Substance Use Topics   • Alcohol use: Not Currently   • Drug use: Never        Medications  Current Outpatient Medications on File Prior to Visit   Medication Sig Dispense Refill   • citalopram (CeleXA) 20 mg tablet Take 20 mg by mouth daily     • hydrochlorothiazide (HYDRODIURIL) 12 5 mg tablet Take 12 5 mg by mouth daily     • lisinopril (ZESTRIL) 40 mg tablet Take 40 mg by mouth daily     • ondansetron (ZOFRAN) 4 mg tablet Take 1 tablet (4 mg total) by mouth every 8 (eight) hours as needed for nausea (Patient not taking: Reported on 4/6/2023) 20 tablet 0   • saccharomyces boulardii (FLORASTOR) 250 mg capsule Take 1 capsule (250 mg total) by mouth 2 (two) times a day (Patient not taking: Reported on 4/6/2023) 14 capsule 0     No current facility-administered medications on file prior to visit  Allergies  No Known Allergies    Review of Systems   Gastrointestinal: Positive for abdominal pain  All other systems reviewed and are negative  Objective  Vitals:    04/06/23 1431   BP: 124/66   Pulse: 58   Temp: 97 6 °F (36 4 °C)   SpO2: 97%       Physical Exam  Vitals reviewed  Constitutional:       General: She is not in acute distress  Appearance: She is obese  She is not ill-appearing  HENT:      Head: Normocephalic and atraumatic  Mouth/Throat:      Mouth: Mucous membranes are moist    Eyes:      General: No scleral icterus  Pupils: Pupils are equal, round, and reactive to light  Cardiovascular:      Rate and Rhythm: Normal rate and regular rhythm  Pulmonary:      Effort: Pulmonary effort is normal  No respiratory distress  Abdominal:      General: A surgical scar is present  There is no distension  Palpations: Abdomen is soft  Tenderness: There is no abdominal tenderness  Hernia: A hernia is present  Hernia is present in the ventral area     Musculoskeletal:         General: No swelling, tenderness or deformity  Cervical back: Normal range of motion  Lymphadenopathy:      Cervical: No cervical adenopathy  Skin:     General: Skin is warm and dry  Capillary Refill: Capillary refill takes 2 to 3 seconds  Neurological:      General: No focal deficit present  Mental Status: She is alert and oriented to person, place, and time  Mental status is at baseline     Psychiatric:         Mood and Affect: Mood normal          Behavior: Behavior normal

## 2023-04-26 ENCOUNTER — ANESTHESIA EVENT (OUTPATIENT)
Dept: PERIOP | Facility: HOSPITAL | Age: 73
End: 2023-04-26

## 2023-04-28 ENCOUNTER — APPOINTMENT (OUTPATIENT)
Dept: LAB | Facility: MEDICAL CENTER | Age: 73
End: 2023-04-28

## 2023-04-28 DIAGNOSIS — K43.2 VENTRAL INCISIONAL HERNIA WITHOUT OBSTRUCTION OR GANGRENE: ICD-10-CM

## 2023-04-28 LAB
ANION GAP SERPL CALCULATED.3IONS-SCNC: 3 MMOL/L (ref 4–13)
BUN SERPL-MCNC: 18 MG/DL (ref 5–25)
CALCIUM SERPL-MCNC: 9.5 MG/DL (ref 8.3–10.1)
CHLORIDE SERPL-SCNC: 108 MMOL/L (ref 96–108)
CO2 SERPL-SCNC: 28 MMOL/L (ref 21–32)
CREAT SERPL-MCNC: 0.87 MG/DL (ref 0.6–1.3)
GFR SERPL CREATININE-BSD FRML MDRD: 66 ML/MIN/1.73SQ M
GLUCOSE P FAST SERPL-MCNC: 92 MG/DL (ref 65–99)
POTASSIUM SERPL-SCNC: 4.2 MMOL/L (ref 3.5–5.3)
SODIUM SERPL-SCNC: 139 MMOL/L (ref 135–147)

## 2023-05-02 NOTE — PRE-PROCEDURE INSTRUCTIONS
Pre-Surgery Instructions:   Medication Instructions    citalopram (CeleXA) 20 mg tablet Take day of surgery   hydrochlorothiazide (HYDRODIURIL) 12 5 mg tablet Hold day of surgery   lisinopril (ZESTRIL) 40 mg tablet Hold day of surgery  Medication instructions for day surgery reviewed  Please use only a sip of water to take your instructed medications  Avoid all over the counter vitamins, supplements and NSAIDS for one week prior to surgery per anesthesia guidelines  Tylenol is ok to take as needed  You will receive a call one business day prior to surgery with an arrival time and hospital directions  If your surgery is scheduled on a Monday, the hospital will be calling you on the Friday prior to your surgery  If you have not heard from anyone by 8pm, please call the hospital supervisor through the hospital  at [phone]  [facility] [phone])  Do not eat or drink anything after midnight the night before your surgery, including candy, mints, lifesavers, or chewing gum  Do not drink alcohol 24hrs before your surgery  Try not to smoke at least 24hrs before your surgery  Follow the pre surgery showering instructions as listed in the [facility] Experience Booklet or otherwise provided by your surgeon's office  Do not shave the surgical area 24 hours before surgery  Do not apply any lotions, creams, including makeup, cologne, deodorant, or perfumes after showering on the day of your surgery  No contact lenses, eye make-up, or artificial eyelashes  Remove nail polish, including gel polish, and any artificial, gel, or acrylic nails if possible  Remove all jewelry including rings and body piercing jewelry  Wear causal clothing that is easy to take on and off  Consider your type of surgery  Keep any valuables, jewelry, piercings at home  Please bring any specially ordered equipment (sling, braces) if indicated      Arrange for a responsible person to drive you to and from the hospital on the day of your surgery  Visitor Guidelines discussed  Call the surgeon's office with any new illnesses, exposures, or additional questions prior to surgery  Please reference your [facility] Booklet for additional information to prepare for your upcoming surgery

## 2023-05-05 ENCOUNTER — HOSPITAL ENCOUNTER (OUTPATIENT)
Facility: HOSPITAL | Age: 73
Setting detail: OUTPATIENT SURGERY
Discharge: HOME | End: 2023-05-05
Attending: SURGERY | Admitting: SURGERY

## 2023-05-05 ENCOUNTER — ANESTHESIA (OUTPATIENT)
Dept: PERIOP | Facility: HOSPITAL | Age: 73
End: 2023-05-05

## 2023-05-05 VITALS
OXYGEN SATURATION: 98 % | HEART RATE: 60 BPM | SYSTOLIC BLOOD PRESSURE: 101 MMHG | RESPIRATION RATE: 22 BRPM | TEMPERATURE: 96.3 F | DIASTOLIC BLOOD PRESSURE: 55 MMHG

## 2023-05-05 DIAGNOSIS — K40.90 LEFT INGUINAL HERNIA: Primary | ICD-10-CM

## 2023-05-05 PROBLEM — R11.2 PONV (POSTOPERATIVE NAUSEA AND VOMITING): Chronic | Status: ACTIVE | Noted: 2023-05-05

## 2023-05-05 PROBLEM — Z98.890 PONV (POSTOPERATIVE NAUSEA AND VOMITING): Chronic | Status: ACTIVE | Noted: 2023-05-05

## 2023-05-05 DEVICE — POLYPROPYLENE NON-ABSORBABLE SYNTHETIC SURGICAL MESH
Type: IMPLANTABLE DEVICE | Site: ABDOMEN | Status: FUNCTIONAL
Brand: PROLENE

## 2023-05-05 RX ORDER — PROPOFOL 10 MG/ML
INJECTION, EMULSION INTRAVENOUS CONTINUOUS PRN
Status: DISCONTINUED | OUTPATIENT
Start: 2023-05-05 | End: 2023-05-05

## 2023-05-05 RX ORDER — BUPIVACAINE HYDROCHLORIDE 5 MG/ML
INJECTION, SOLUTION PERINEURAL AS NEEDED
Status: DISCONTINUED | OUTPATIENT
Start: 2023-05-05 | End: 2023-05-05 | Stop reason: HOSPADM

## 2023-05-05 RX ORDER — MORPHINE SULFATE 10 MG/ML
2 INJECTION, SOLUTION INTRAMUSCULAR; INTRAVENOUS EVERY 4 HOURS PRN
Status: DISCONTINUED | OUTPATIENT
Start: 2023-05-05 | End: 2023-05-05 | Stop reason: HOSPADM

## 2023-05-05 RX ORDER — ONDANSETRON 2 MG/ML
4 INJECTION INTRAMUSCULAR; INTRAVENOUS ONCE
Status: DISCONTINUED | OUTPATIENT
Start: 2023-05-05 | End: 2023-05-05

## 2023-05-05 RX ORDER — CHLORHEXIDINE GLUCONATE 4 G/100ML
SOLUTION TOPICAL DAILY PRN
Status: DISCONTINUED | OUTPATIENT
Start: 2023-05-05 | End: 2023-05-05 | Stop reason: HOSPADM

## 2023-05-05 RX ORDER — ONDANSETRON 2 MG/ML
INJECTION INTRAMUSCULAR; INTRAVENOUS AS NEEDED
Status: DISCONTINUED | OUTPATIENT
Start: 2023-05-05 | End: 2023-05-05

## 2023-05-05 RX ORDER — FENTANYL CITRATE/PF 50 MCG/ML
50 SYRINGE (ML) INJECTION
Status: DISCONTINUED | OUTPATIENT
Start: 2023-05-05 | End: 2023-05-05 | Stop reason: HOSPADM

## 2023-05-05 RX ORDER — DEXAMETHASONE SODIUM PHOSPHATE 10 MG/ML
INJECTION, SOLUTION INTRAMUSCULAR; INTRAVENOUS AS NEEDED
Status: DISCONTINUED | OUTPATIENT
Start: 2023-05-05 | End: 2023-05-05

## 2023-05-05 RX ORDER — GLYCOPYRROLATE 0.2 MG/ML
INJECTION INTRAMUSCULAR; INTRAVENOUS AS NEEDED
Status: DISCONTINUED | OUTPATIENT
Start: 2023-05-05 | End: 2023-05-05

## 2023-05-05 RX ORDER — PROPOFOL 10 MG/ML
INJECTION, EMULSION INTRAVENOUS AS NEEDED
Status: DISCONTINUED | OUTPATIENT
Start: 2023-05-05 | End: 2023-05-05

## 2023-05-05 RX ORDER — ROCURONIUM BROMIDE 10 MG/ML
INJECTION, SOLUTION INTRAVENOUS AS NEEDED
Status: DISCONTINUED | OUTPATIENT
Start: 2023-05-05 | End: 2023-05-05

## 2023-05-05 RX ORDER — HYDROMORPHONE HCL IN WATER/PF 6 MG/30 ML
0.2 PATIENT CONTROLLED ANALGESIA SYRINGE INTRAVENOUS
Status: DISCONTINUED | OUTPATIENT
Start: 2023-05-05 | End: 2023-05-05 | Stop reason: HOSPADM

## 2023-05-05 RX ORDER — CEFAZOLIN SODIUM 2 G/50ML
2000 SOLUTION INTRAVENOUS ONCE
Status: COMPLETED | OUTPATIENT
Start: 2023-05-05 | End: 2023-05-05

## 2023-05-05 RX ORDER — MAGNESIUM HYDROXIDE 1200 MG/15ML
LIQUID ORAL AS NEEDED
Status: DISCONTINUED | OUTPATIENT
Start: 2023-05-05 | End: 2023-05-05 | Stop reason: HOSPADM

## 2023-05-05 RX ORDER — NEOSTIGMINE METHYLSULFATE 1 MG/ML
INJECTION INTRAVENOUS AS NEEDED
Status: DISCONTINUED | OUTPATIENT
Start: 2023-05-05 | End: 2023-05-05

## 2023-05-05 RX ORDER — EPHEDRINE SULFATE 50 MG/ML
INJECTION INTRAVENOUS AS NEEDED
Status: DISCONTINUED | OUTPATIENT
Start: 2023-05-05 | End: 2023-05-05

## 2023-05-05 RX ORDER — FENTANYL CITRATE 50 UG/ML
INJECTION, SOLUTION INTRAMUSCULAR; INTRAVENOUS AS NEEDED
Status: DISCONTINUED | OUTPATIENT
Start: 2023-05-05 | End: 2023-05-05

## 2023-05-05 RX ORDER — ONDANSETRON 2 MG/ML
4 INJECTION INTRAMUSCULAR; INTRAVENOUS EVERY 8 HOURS PRN
Status: DISCONTINUED | OUTPATIENT
Start: 2023-05-05 | End: 2023-05-05 | Stop reason: HOSPADM

## 2023-05-05 RX ORDER — LIDOCAINE HYDROCHLORIDE 10 MG/ML
INJECTION, SOLUTION EPIDURAL; INFILTRATION; INTRACAUDAL; PERINEURAL AS NEEDED
Status: DISCONTINUED | OUTPATIENT
Start: 2023-05-05 | End: 2023-05-05

## 2023-05-05 RX ORDER — OXYCODONE HYDROCHLORIDE AND ACETAMINOPHEN 5; 325 MG/1; MG/1
2 TABLET ORAL EVERY 4 HOURS PRN
Status: DISCONTINUED | OUTPATIENT
Start: 2023-05-05 | End: 2023-05-05 | Stop reason: HOSPADM

## 2023-05-05 RX ORDER — MIDAZOLAM HYDROCHLORIDE 2 MG/2ML
INJECTION, SOLUTION INTRAMUSCULAR; INTRAVENOUS AS NEEDED
Status: DISCONTINUED | OUTPATIENT
Start: 2023-05-05 | End: 2023-05-05

## 2023-05-05 RX ORDER — TRAMADOL HYDROCHLORIDE 50 MG/1
50 TABLET ORAL EVERY 6 HOURS PRN
Status: DISCONTINUED | OUTPATIENT
Start: 2023-05-05 | End: 2023-05-05 | Stop reason: HOSPADM

## 2023-05-05 RX ORDER — HEPARIN SODIUM 5000 [USP'U]/ML
5000 INJECTION, SOLUTION INTRAVENOUS; SUBCUTANEOUS ONCE
Status: COMPLETED | OUTPATIENT
Start: 2023-05-05 | End: 2023-05-05

## 2023-05-05 RX ORDER — SODIUM CHLORIDE, SODIUM LACTATE, POTASSIUM CHLORIDE, CALCIUM CHLORIDE 600; 310; 30; 20 MG/100ML; MG/100ML; MG/100ML; MG/100ML
125 INJECTION, SOLUTION INTRAVENOUS CONTINUOUS
Status: DISCONTINUED | OUTPATIENT
Start: 2023-05-05 | End: 2023-05-05 | Stop reason: HOSPADM

## 2023-05-05 RX ORDER — ONDANSETRON 2 MG/ML
4 INJECTION INTRAMUSCULAR; INTRAVENOUS ONCE AS NEEDED
Status: DISCONTINUED | OUTPATIENT
Start: 2023-05-05 | End: 2023-05-05 | Stop reason: HOSPADM

## 2023-05-05 RX ORDER — OXYCODONE HYDROCHLORIDE AND ACETAMINOPHEN 5; 325 MG/1; MG/1
1 TABLET ORAL EVERY 4 HOURS PRN
Qty: 20 TABLET | Refills: 0 | Status: SHIPPED | OUTPATIENT
Start: 2023-05-05 | End: 2023-05-05

## 2023-05-05 RX ORDER — TRAMADOL HYDROCHLORIDE 50 MG/1
50 TABLET ORAL EVERY 6 HOURS PRN
Qty: 20 TABLET | Refills: 0 | Status: SHIPPED | OUTPATIENT
Start: 2023-05-05

## 2023-05-05 RX ORDER — LIDOCAINE HYDROCHLORIDE 10 MG/ML
INJECTION, SOLUTION EPIDURAL; INFILTRATION; INTRACAUDAL; PERINEURAL AS NEEDED
Status: DISCONTINUED | OUTPATIENT
Start: 2023-05-05 | End: 2023-05-05 | Stop reason: HOSPADM

## 2023-05-05 RX ORDER — SODIUM CHLORIDE, SODIUM LACTATE, POTASSIUM CHLORIDE, CALCIUM CHLORIDE 600; 310; 30; 20 MG/100ML; MG/100ML; MG/100ML; MG/100ML
INJECTION, SOLUTION INTRAVENOUS CONTINUOUS PRN
Status: DISCONTINUED | OUTPATIENT
Start: 2023-05-05 | End: 2023-05-05

## 2023-05-05 RX ADMIN — ONDANSETRON 4 MG: 2 INJECTION INTRAMUSCULAR; INTRAVENOUS at 10:45

## 2023-05-05 RX ADMIN — ROCURONIUM BROMIDE 40 MG: 10 INJECTION, SOLUTION INTRAVENOUS at 09:40

## 2023-05-05 RX ADMIN — LIDOCAINE HYDROCHLORIDE 50 MG: 10 INJECTION, SOLUTION EPIDURAL; INFILTRATION; INTRACAUDAL; PERINEURAL at 09:40

## 2023-05-05 RX ADMIN — GLYCOPYRROLATE 0.8 MG: 0.2 INJECTION, SOLUTION INTRAMUSCULAR; INTRAVENOUS at 11:19

## 2023-05-05 RX ADMIN — PROPOFOL 140 MCG/KG/MIN: 10 INJECTION, EMULSION INTRAVENOUS at 09:45

## 2023-05-05 RX ADMIN — FENTANYL CITRATE 50 MCG: 50 INJECTION, SOLUTION INTRAMUSCULAR; INTRAVENOUS at 12:14

## 2023-05-05 RX ADMIN — FENTANYL CITRATE 50 MCG: 50 INJECTION, SOLUTION INTRAMUSCULAR; INTRAVENOUS at 10:43

## 2023-05-05 RX ADMIN — CEFAZOLIN SODIUM 2000 MG: 2 SOLUTION INTRAVENOUS at 09:50

## 2023-05-05 RX ADMIN — FENTANYL CITRATE 50 MCG: 50 INJECTION, SOLUTION INTRAMUSCULAR; INTRAVENOUS at 12:07

## 2023-05-05 RX ADMIN — HEPARIN SODIUM 5000 UNITS: 5000 INJECTION INTRAVENOUS; SUBCUTANEOUS at 08:44

## 2023-05-05 RX ADMIN — PHENYLEPHRINE HYDROCHLORIDE 30 MCG/MIN: 10 INJECTION INTRAVENOUS at 09:47

## 2023-05-05 RX ADMIN — EPHEDRINE SULFATE 10 MG: 50 INJECTION, SOLUTION INTRAVENOUS at 10:44

## 2023-05-05 RX ADMIN — SODIUM CHLORIDE, SODIUM LACTATE, POTASSIUM CHLORIDE, AND CALCIUM CHLORIDE 125 ML/HR: .6; .31; .03; .02 INJECTION, SOLUTION INTRAVENOUS at 08:25

## 2023-05-05 RX ADMIN — PROPOFOL 140 MG: 10 INJECTION, EMULSION INTRAVENOUS at 09:40

## 2023-05-05 RX ADMIN — FENTANYL CITRATE 50 MCG: 50 INJECTION, SOLUTION INTRAMUSCULAR; INTRAVENOUS at 09:40

## 2023-05-05 RX ADMIN — SODIUM CHLORIDE, SODIUM LACTATE, POTASSIUM CHLORIDE, AND CALCIUM CHLORIDE: .6; .31; .03; .02 INJECTION, SOLUTION INTRAVENOUS at 10:51

## 2023-05-05 RX ADMIN — NEOSTIGMINE METHYLSULFATE 5 MG: 1 INJECTION INTRAVENOUS at 11:19

## 2023-05-05 RX ADMIN — SODIUM CHLORIDE, SODIUM LACTATE, POTASSIUM CHLORIDE, AND CALCIUM CHLORIDE: .6; .31; .03; .02 INJECTION, SOLUTION INTRAVENOUS at 09:37

## 2023-05-05 RX ADMIN — MIDAZOLAM 2 MG: 1 INJECTION INTRAMUSCULAR; INTRAVENOUS at 09:37

## 2023-05-05 RX ADMIN — DEXAMETHASONE SODIUM PHOSPHATE 10 MG: 10 INJECTION, SOLUTION INTRAMUSCULAR; INTRAVENOUS at 09:40

## 2023-05-05 RX ADMIN — FENTANYL CITRATE 25 MCG: 50 INJECTION, SOLUTION INTRAMUSCULAR; INTRAVENOUS at 11:05

## 2023-05-05 RX ADMIN — TRAMADOL HYDROCHLORIDE 50 MG: 50 TABLET, COATED ORAL at 13:46

## 2023-05-05 RX ADMIN — PROPOFOL 50 MG: 10 INJECTION, EMULSION INTRAVENOUS at 10:43

## 2023-05-05 NOTE — OP NOTE
OPERATIVE REPORT  PATIENT NAME: Mago Hanson    :  1950  MRN: [id number]  Pt Location: [facility] OR ROOM 01    SURGERY DATE: 2023    Surgeon(s) and Role: * Martha Mcnamara,  - Primary     * Daina Villegas PA-C - Assisting    Preop Diagnosis:  Ventral incisional hernia without obstruction or gangrene [K43 2]    Post-Op Diagnosis Codes: * Ventral incisional hernia without obstruction or gangrene [K43 2]    Procedure(s):  Left - LAPAROSCOPY DIAGNOSTIC with open repair of left inguinal hernia with mesh    Specimen(s):  * No specimens in log *    Estimated Blood Loss:   10 mL    Drains:  * No LDAs found *    Anesthesia Type:   * No anesthesia type entered *    Operative Indications:  Ventral incisional hernia without obstruction or gangrene [K43 2]      Operative Findings:  Diagnostic laparoscopy revealed a large left inguinal hernia containing fat  This was reduced  Just medial to the opening was some adhesions with what appeared to be some previously ischemic fat  This was gently teased away from abdominal wall  This then revealed a large left inguinal hernia  At this point was decided to abort the laparoscopic portion of the procedure and converted to an left inguinal open hernia repair with mesh  The ilioinguinal nerve was injected and sacrificed    Complications:   None    Procedure and Technique:  The patient was taken to the operating arena and placed in supine position on operating table  All irregular monitoring devices were connected  Gen  Anesthesia was induced and the patient underwent uncomplicated endotracheal intubation  Perioperative antibiotics were administered  Subcutaneous heparin along with bilateral lower extremity sequential compression devices were placed for DVT prophylaxis  The patient was then prepped and draped in usual sterile fashion  A timeout was performed to verify correct patient, site, procedure, and positioning       A midline supraumbilical incision was made using a 15 blade scalpel  Incision was carried down to the subcutaneous fat using Bovie electrocautery  Further dissection continued using S retractors into the fascia identified  The fascia was grasped with 2 [place] clamps elevated incised with Bovie electrocautery  A finger was inserted and then using finger fracture technique the peritoneum was incised  A 12 mm Bender trocar was then entered without complication  The abdomen is insufflated carbon oxide to a pressure of 12-40 mils mercury  [facility]  Laparoscope was then entered and the abdomen was inspected  No injury noted  The left lower quadrant was then inspected and there appeared to be some adhesed omentum to the left lower quadrant abdominal wall and sidewall  A right lower quadrant 5 mm trocar was then placed without complication  The adhesive fat was gently dissected free of the abdominal wall  This revealed a large what appeared to be inguinal hernia containing fat  This was then reduced  To confirm inguinal the right lower quadrant is also visualized and there appeared to be also a very small inguinal hernia containing fat but not nearly as large as the left  There appeared to be no obvious spigelian hernia  At this point it was decided to abort diagnostic laparoscopy procedure and convert to an open left inguinal hernia repair  Landmarks were then identified including the left ASIS and pubic tubercle  An incision was marked in a natural skin crease approximately 3 fingerbreadths above theleft groin crease, and planned to end near the pubic tubercle  The skin crease incision was made with a 15 blade scalpel and deepened through Dax's and Camper's fascia with electrocautery until the aponeurosis of the external oblique was encountered  This was cleaned and the external ring was exposed  Hemostasis was achieved in the wound  At this point it was noted the hernia to be bulging through the internal ring  Incision was made in the midportion of the external oblique aponeurosis in the direction of its fibersl  The ilioinguinal nerve was identified and based on this position and interference with the hernia it was decided to be sacrificed  It was injected with lidocaine and then transected using Metzenbaum scissors     Flaps of the external oblique were developed cephalad and inferiorly  The hernia was in [place] and appeared to be coming from a large hole in the floor of the inguinal canal   The round ligament appeared to be intertwined within the hernia sac  The hernia sac, contained fatty tissue, was then carefully dissected  using both blunt and sharp technique  To completely free up the fatty tissue the round ligament was sacrificed  Once this was completed the hernia was easily reducible back into the abdominal cavity  Hemostasis was achieved at this point  Attention was then focused on repair the hernia  The floor of the inguinal canal was obviously obliterated and that is where the hernia was arising from  The hernia was then freed up and reduced back into the abdominal cavity and the floor was oversewn with a 2-0 Vicryl in a running fashion  Once this repair was completed the floor of the canal was then reinforced using a mesh onlay  The mesh was secured at the pubic tubercle, in addition to multiple spots along the conjoined tendon and the shelving edge using interrupted 2-0 Vicryl sutures  Inguinal canal was then irrigated with warm normal saline  Hemostasis was secured  The external oblique fascia was then approximated using running 3-0 Vicryl suture  The wound was then again irrigated with warm normal saline  Dax's fascia was then approximated with interrupted 3-0 Vicryl sutures  Skin was then closed with 4-0 Monocryl in a running fashion  I was present for the entire procedure , A qualified resident physician was not available   and A physician assistant was required during the procedure for retraction, tissue handling, dissection and suturing      Patient Disposition:  PACU     This procedure was not performed to treat colon cancer through resection      SIGNATURE: Alex Cox DO  DATE: May 5, 2023  TIME: 11:24 AM

## 2023-05-05 NOTE — DISCHARGE INSTR - AVS FIRST PAGE
Follow-up with Dr Aditya Arechiga in 2 weeks as per appointment  Regular diet as tolerated  Low intense activity as tolerated, no heavy lifting, nothing greater than 10 pounds for 6 weeks  Dressing may be removed on Sunday  He may shower on Sunday  No baths or swimming  If develop fever, nausea vomiting, seeing pain, redness or drainage from incision call the office or go to the ER

## 2023-05-05 NOTE — ANESTHESIA PREPROCEDURE EVALUATION
Procedure:  REPAIR HERNIA VENTRAL LAPAROSCOPIC (Abdomen)    Relevant Problems   ANESTHESIA   (+) PONV (postoperative nausea and vomiting)      CARDIO   (+) Primary hypertension      /RENAL   (+) HARRIS (acute kidney injury) ([facility] [address] )        Physical Exam    Airway    Mallampati score: II  TM Distance: >3 FB  Neck ROM: full     Dental   upper dentures and lower dentures,     Cardiovascular  Rate: normal,     Pulmonary  Pulmonary exam normal     Other Findings  Per pt denies anything remaining that is loose or removeable      Anesthesia Plan  ASA Score- 2     Anesthesia Type- general with ASA Monitors  Additional Monitors:   Airway Plan: ETT  Plan Factors-Exercise tolerance (METS): >4 METS  Chart reviewed  Patient summary reviewed  Patient is not a current smoker  Induction- intravenous  Postoperative Plan- Plan for postoperative opioid use  Informed Consent- Anesthetic plan and risks discussed with patient  I personally reviewed this patient with the CRNA  Discussed and agreed on the Anesthesia Plan with the CRNA  Deidre Stockton

## 2023-05-05 NOTE — INTERVAL H&P NOTE
H&P reviewed  After examining the patient I find no changes in the patients condition since the H&P had been written      Vitals:    05/05/23 0810   BP: 116/62   Pulse: 61   Resp: 18   Temp: (!) 96 5 °F (35 8 °C)   SpO2: 96%

## 2023-05-05 NOTE — ANESTHESIA POSTPROCEDURE EVALUATION
Post-Op Assessment Note    CV Status:  Stable  Pain Score: 0    Pain management: adequate     Mental Status:  Alert and awake   Hydration Status:  Euvolemic   PONV Controlled:  Controlled   Airway Patency:  Patent      Post Op Vitals Reviewed: Yes      Staff: CRNA         No notable events documented      BP  123/58    Temp  97 4   Pulse  65   Resp   18   SpO2   97

## 2023-05-25 ENCOUNTER — OFFICE VISIT (OUTPATIENT)
Dept: SURGERY | Facility: CLINIC | Age: 73
End: 2023-05-25

## 2023-05-25 ENCOUNTER — APPOINTMENT (OUTPATIENT)
Dept: LAB | Facility: MEDICAL CENTER | Age: 73
End: 2023-05-25

## 2023-05-25 VITALS
HEIGHT: 66 IN | TEMPERATURE: 97.8 F | HEART RATE: 71 BPM | WEIGHT: 205 LBS | BODY MASS INDEX: 32.95 KG/M2 | DIASTOLIC BLOOD PRESSURE: 68 MMHG | SYSTOLIC BLOOD PRESSURE: 118 MMHG | OXYGEN SATURATION: 97 %

## 2023-05-25 DIAGNOSIS — K40.90 INGUINAL HERNIA OF LEFT SIDE WITHOUT OBSTRUCTION OR GANGRENE: Primary | ICD-10-CM

## 2023-05-25 DIAGNOSIS — K40.90 INGUINAL HERNIA OF LEFT SIDE WITHOUT OBSTRUCTION OR GANGRENE: ICD-10-CM

## 2023-05-25 LAB
ANION GAP SERPL CALCULATED.3IONS-SCNC: -1 MMOL/L (ref 4–13)
BUN SERPL-MCNC: 16 MG/DL (ref 5–25)
CALCIUM SERPL-MCNC: 9.3 MG/DL (ref 8.3–10.1)
CHLORIDE SERPL-SCNC: 108 MMOL/L (ref 96–108)
CO2 SERPL-SCNC: 28 MMOL/L (ref 21–32)
CREAT SERPL-MCNC: 1 MG/DL (ref 0.6–1.3)
GFR SERPL CREATININE-BSD FRML MDRD: 56 ML/MIN/1.73SQ M
GLUCOSE SERPL-MCNC: 92 MG/DL (ref 65–140)
POTASSIUM SERPL-SCNC: 4.2 MMOL/L (ref 3.5–5.3)
SODIUM SERPL-SCNC: 135 MMOL/L (ref 135–147)

## 2023-05-25 NOTE — ASSESSMENT & PLAN NOTE
68-year-old female seen in the office today for postoperative follow-up, she underwent diagnostic laparoscopy and followed with open left inguinal hernia repair with mesh on May 5, 2023  Overall the patient is doing well  Denies any complaints at this time  Incision is well-healed  No drainage or redness  Denies any pain  She is not had an abdominal binder, she is wearing an abdominal girdle to help support her abdomen  Normal bowel movements  Tolerating diet without problem  The patient had been hospitalized 2 weeks prior to her surgery because of acute renal failure and dehydration  Her family physician is in the [facility], [address] [place] [address], and she had not been seen yet by him since her surgery  The patient is concerned about her renal function, she would like a BMP ordered to follow-up for her HARRIS performed in the [facility] and that she would provide her PCP with the results  Left inguinal incision is clean, dry, appears very well-healed  Entirely soft and nontender  Umbilical trocar incision is also clean and healing well, entirely nontender abdomen  At this time no further follow-up with general surgery is required  Follow-up with PCP as scheduled  Outpatient BMP ordered  Patient to avoid heavy lifting more than 20 pounds for another 4 weeks  Glen Storey PA-C      OPERATIVE REPORT  PATIENT NAME: Tomas Combs    :  1950  MRN: [id number]  Pt Location: [facility] OR ROOM 01     SURGERY DATE: 2023     Surgeon(s) and Role: * Ember Martinez,  - Primary     * Daina Villegas PA-C - Assisting     Preop Diagnosis:  Ventral incisional hernia without obstruction or gangrene [K43 2]     Post-Op Diagnosis Codes:      * Ventral incisional hernia without obstruction or gangrene [K43 2]     Procedure(s):  Left - LAPAROSCOPY DIAGNOSTIC with open repair of left inguinal hernia with mesh     Specimen(s):  * No specimens in log *     Estimated Blood Loss:   10 mL     Drains:  * No LDAs found *     Anesthesia Type:   * No anesthesia type entered *     Operative Indications:  Ventral incisional hernia without obstruction or gangrene [K43 2]        Operative Findings:  Diagnostic laparoscopy revealed a large left inguinal hernia containing fat  This was reduced  Just medial to the opening was some adhesions with what appeared to be some previously ischemic fat  This was gently teased away from abdominal wall  This then revealed a large left inguinal hernia  At this point was decided to abort the laparoscopic portion of the procedure and converted to an left inguinal open hernia repair with mesh    The ilioinguinal nerve was injected and sacrificed

## 2023-05-25 NOTE — PROGRESS NOTES
Assessment/Plan: Postoperative wound check and follow-up status post diagnostic laparoscopy and open repair left inguinal hernia with mesh on May 5, 2023  Problem List Items Addressed This Visit        Other    Inguinal hernia of left side without obstruction or gangrene - Primary     72-year-old female seen in the office today for postoperative follow-up, she underwent diagnostic laparoscopy and followed with open left inguinal hernia repair with mesh on May 5, 2023  Overall the patient is doing well  Denies any complaints at this time  Incision is well-healed  No drainage or redness  Denies any pain  She is not had an abdominal binder, she is wearing an abdominal girdle to help support her abdomen  Normal bowel movements  Tolerating diet without problem  The patient had been hospitalized 2 weeks prior to her surgery because of acute renal failure and dehydration  Her family physician is in the [facility], [address] [place] [address], and she had not been seen yet by him since her surgery  The patient is concerned about her renal function, she would like a BMP ordered to follow-up for her HARRIS performed in the [facility] and that she would provide her PCP with the results  Left inguinal incision is clean, dry, appears very well-healed  Entirely soft and nontender  Umbilical trocar incision is also clean and healing well, entirely nontender abdomen  At this time no further follow-up with general surgery is required  Follow-up with PCP as scheduled  Outpatient BMP ordered  Patient to avoid heavy lifting more than 20 pounds for another 4 weeks  Jose Lopez PA-C      OPERATIVE REPORT  PATIENT NAME: Nena Diaz    :  1950  MRN: [id number]  Pt Location: [facility] OR ROOM 01     SURGERY DATE: 2023     Surgeon(s) and Role:      * Cesario Dunne DO - Primary     * Daina Villegas PA-C - Assisting     Preop Diagnosis:  Ventral incisional hernia without obstruction or gangrene [K43 2]     Post-Op Diagnosis Codes: * Ventral incisional hernia without obstruction or gangrene [K43 2]     Procedure(s):  Left - LAPAROSCOPY DIAGNOSTIC with open repair of left inguinal hernia with mesh     Specimen(s):  * No specimens in log *     Estimated Blood Loss:   10 mL     Drains:  * No LDAs found *     Anesthesia Type:   * No anesthesia type entered *     Operative Indications:  Ventral incisional hernia without obstruction or gangrene [K43 2]        Operative Findings:  Diagnostic laparoscopy revealed a large left inguinal hernia containing fat  This was reduced  Just medial to the opening was some adhesions with what appeared to be some previously ischemic fat  This was gently teased away from abdominal wall  This then revealed a large left inguinal hernia  At this point was decided to abort the laparoscopic portion of the procedure and converted to an left inguinal open hernia repair with mesh  The ilioinguinal nerve was injected and sacrificed                 Subjective: 2-week postoperative wound check and follow-up  Patient ID: Farhan Hicks is a 68 y o  female  HPI patient was initially seen by general surgery in the office for evaluation of left lower quadrant and inguinal pain on April 6, thought initially for diverticulitis  Patient continued with discomfort, she was sent for CT scan of the abdomen pelvis on March 23 which showed possible ventral hernia  She was hospitalized here on March 29 because of acute kidney injury and dehydration  Her PCP is Dr Carol Hammer in the [facility] area  She has not seen him since surgery  Patient underwent diagnostic laparoscopy and open left inguinal hernia repair with mesh by Dr Satinder Hernandez on May 5, 2023  She was discharged same day  She denies any complaints at this time  No wound healing problems  No drainage or redness      The patient was hospitalized the last week in March here at [id number] [facility] with dehydration and acute kidney injury, her GFR at that time was 15  The patient has not seen her primary care physician and is concerned about her kidney function  She is requesting a BMP be ordered that she can have performed here at the hospital, her most recent BMP was on April 28 with resolved kidney injury, her GFR at that time was 77 with a creatinine 0 87  The following portions of the patient's history were reviewed and updated as appropriate:   She  has a past medical history of Diverticulitis, Hypertension, PONV (postoperative nausea and vomiting), and Psychiatric disorder  She   Patient Active Problem List    Diagnosis Date Noted   • Inguinal hernia of left side without obstruction or gangrene 05/25/2023   • PONV (postoperative nausea and vomiting) 05/05/2023   • HARRIS (acute kidney injury) ([facility] [address] ) 03/27/2023   • Primary hypertension 03/27/2023   • Nausea, vomiting and diarrhea 03/27/2023     She  has a past surgical history that includes Appendectomy; Hysterectomy; Cholecystectomy; and pr laps abd prtm&omentum dx w/wo spec br/wa spx (Left, 5/5/2023)  Her family history is not on file  She  reports that she has never smoked  She has never used smokeless tobacco  She reports that she does not currently use alcohol  She reports that she does not use drugs    Current Outpatient Medications   Medication Sig Dispense Refill   • citalopram (CeleXA) 20 mg tablet Take 20 mg by mouth daily     • hydrochlorothiazide (HYDRODIURIL) 12 5 mg tablet Take 12 5 mg by mouth daily     • lisinopril (ZESTRIL) 40 mg tablet Take 40 mg by mouth daily     • ondansetron (ZOFRAN) 4 mg tablet Take 1 tablet (4 mg total) by mouth every 8 (eight) hours as needed for nausea (Patient not taking: Reported on 4/6/2023) 20 tablet 0   • saccharomyces boulardii (FLORASTOR) 250 mg capsule Take 1 capsule (250 mg total) by mouth 2 (two) times a day (Patient not taking: Reported on 4/6/2023) 14 capsule 0   • traMADol (Ultram) 50 mg tablet Take 1 "tablet (50 mg total) by mouth every 6 (six) hours as needed for moderate pain 20 tablet 0     No current facility-administered medications for this visit  She has No Known Allergies       Review of Systems   Respiratory: Negative  Cardiovascular: Negative  Gastrointestinal: Negative for abdominal distention, abdominal pain, diarrhea and nausea  Endocrine: Negative  Genitourinary: Negative for difficulty urinating  Musculoskeletal: Negative  Skin: Positive for wound  Negative for color change, pallor and rash  Allergic/Immunologic: Negative  Neurological: Negative  Hematological: Negative  Psychiatric/Behavioral: Negative  All other systems reviewed and are negative  Objective:    /68 (BP Location: Left arm, Patient Position: Sitting, Cuff Size: Standard)   Pulse 71   Temp 97 8 °F (36 6 °C) (Tympanic)   Ht 5' 6\" (1 676 m)   Wt 93 kg (205 lb)   SpO2 97%   BMI 33 09 kg/m²      Physical Exam  Vitals reviewed  Constitutional:       General: She is not in acute distress  Appearance: She is not ill-appearing or toxic-appearing  HENT:      Head: Normocephalic  Nose: Nose normal       Mouth/Throat:      Mouth: Mucous membranes are moist       Pharynx: Oropharynx is clear  Eyes:      Conjunctiva/sclera: Conjunctivae normal    Cardiovascular:      Rate and Rhythm: Normal rate and regular rhythm  Pulses: Normal pulses  Heart sounds: No murmur heard  Pulmonary:      Effort: Pulmonary effort is normal       Breath sounds: Normal breath sounds  No wheezing  Abdominal:      General: Bowel sounds are normal  There is no distension  Palpations: There is no mass  Tenderness: There is no abdominal tenderness  There is no right CVA tenderness, left CVA tenderness, guarding or rebound  Hernia: No hernia is present  Comments: Umbilical trocar incision is clean and dry, no drainage or erythema    Left lower abdominal inguinal scar appears to " be well-healed, no wound disruption, no drainage or erythema  No fluctuation  Entirely nontender  Abdomen is soft throughout  Musculoskeletal:         General: No swelling or deformity  Normal range of motion  Cervical back: Normal range of motion  Right lower leg: No edema  Left lower leg: No edema  Skin:     General: Skin is warm and dry  Capillary Refill: Capillary refill takes less than 2 seconds  Findings: No bruising  Neurological:      General: No focal deficit present  Mental Status: She is alert  Mental status is at baseline  Motor: No weakness        Gait: Gait normal    Psychiatric:         Mood and Affect: Mood normal          Behavior: Behavior normal        Yessenia Thomas PA-C

## 2023-07-18 ENCOUNTER — APPOINTMENT (EMERGENCY)
Dept: RADIOLOGY | Facility: HOSPITAL | Age: 73
End: 2023-07-18
Payer: MEDICARE

## 2023-07-18 ENCOUNTER — HOSPITAL ENCOUNTER (EMERGENCY)
Facility: HOSPITAL | Age: 73
Discharge: HOME | End: 2023-07-18
Attending: EMERGENCY MEDICINE | Admitting: EMERGENCY MEDICINE
Payer: MEDICARE

## 2023-07-18 VITALS
OXYGEN SATURATION: 98 % | BODY MASS INDEX: 33.11 KG/M2 | TEMPERATURE: 98.1 F | WEIGHT: 206 LBS | SYSTOLIC BLOOD PRESSURE: 120 MMHG | HEIGHT: 66 IN | RESPIRATION RATE: 18 BRPM | HEART RATE: 70 BPM | DIASTOLIC BLOOD PRESSURE: 62 MMHG

## 2023-07-18 DIAGNOSIS — S51.811A LACERATION OF RIGHT FOREARM, INITIAL ENCOUNTER: ICD-10-CM

## 2023-07-18 DIAGNOSIS — S61.411A LACERATION OF RIGHT HAND: Primary | ICD-10-CM

## 2023-07-18 PROCEDURE — 73130 X-RAY EXAM OF HAND: CPT

## 2023-07-18 PROCEDURE — 73090 X-RAY EXAM OF FOREARM: CPT

## 2023-07-18 PROCEDURE — 90715 TDAP VACCINE 7 YRS/> IM: CPT | Performed by: EMERGENCY MEDICINE

## 2023-07-18 RX ORDER — LIDOCAINE HYDROCHLORIDE AND EPINEPHRINE 10; 10 MG/ML; UG/ML
10 INJECTION, SOLUTION INFILTRATION; PERINEURAL ONCE
Status: COMPLETED | OUTPATIENT
Start: 2023-07-18 | End: 2023-07-18

## 2023-07-18 RX ADMIN — LIDOCAINE HYDROCHLORIDE,EPINEPHRINE BITARTRATE 10 ML: 10; .01 INJECTION, SOLUTION INFILTRATION; PERINEURAL at 17:56

## 2023-07-18 RX ADMIN — TETANUS TOXOID, REDUCED DIPHTHERIA TOXOID AND ACELLULAR PERTUSSIS VACCINE, ADSORBED 0.5 ML: 5; 2.5; 8; 8; 2.5 SUSPENSION INTRAMUSCULAR at 17:52

## 2023-07-18 NOTE — ED PROVIDER NOTES
History  Chief Complaint   Patient presents with   • Extremity Laceration     Patient reports she was making dinner when the pan hit against her kitchen sink and shattered. Reports a cut to the knuckle on her R hand, bleeding controlled at this time. 67 y/o R handed woman presents with lacerations to dorsum of R forearm and dorsal R hand between 4th/5th metacarpals occurring about 20 minutes prior to arrival when a glass pan that she was putting into her  hit the side of her kitchen sink and broke. The broken glass caused a laceration to the dorsal aspect of her forearm and the dorsum of the right hand. States that she saw a visible glass shard in the dorsum of the right forearm which she removed. She did wash out both of the areas prior to coming to the emergency department. Last tetanus vaccine greater than 10 years prior. Has been able to move all digits in the right hand without difficulty since the injury. No other trauma/injury from episode. Right hand laceration without evidence of tendon injury. No neurovascular injury. Laceration was thoroughly irrigated. It was closed easily as per the procedure note. Tetanus vaccine was administered. Laceration on the dorsum of the right forearm is quite superficial and does not require closure. Basic wound care instructions reviewed with the patient. 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 Suture removal in 7 days. ED return for any signs/symptoms of wound infection. All questions were answered to her satisfaction prior to discharge. History provided by:  Medical records and patient      Prior to Admission Medications   Prescriptions Last Dose Informant Patient Reported?  Taking?   citalopram (CeleXA) 20 mg tablet   Yes No   Sig: Take 20 mg by mouth daily   hydrochlorothiazide (HYDRODIURIL) 12.5 mg tablet   Yes No   Sig: Take 12.5 mg by mouth daily   lisinopril (ZESTRIL) 40 mg tablet   Yes No   Sig: Take 40 mg by mouth daily   ondansetron (ZOFRAN) 4 mg tablet   No No   Sig: Take 1 tablet (4 mg total) by mouth every 8 (eight) hours as needed for nausea   Patient not taking: Reported on 4/6/2023   saccharomyces boulardii (FLORASTOR) 250 mg capsule   No No   Sig: Take 1 capsule (250 mg total) by mouth 2 (two) times a day   Patient not taking: Reported on 4/6/2023   traMADol (Ultram) 50 mg tablet   No No   Sig: Take 1 tablet (50 mg total) by mouth every 6 (six) hours as needed for moderate pain      Facility-Administered Medications: None       Past Medical History:   Diagnosis Date   • Diverticulitis    • Hypertension    • PONV (postoperative nausea and vomiting)    • Psychiatric disorder     depression       Past Surgical History:   Procedure Laterality Date   • APPENDECTOMY     • CHOLECYSTECTOMY     • HYSTERECTOMY     • NC LAPS ABD PRTM&OMENTUM DX W/WO SPEC BR/WA SPX Left 5/5/2023    Procedure: LAPAROSCOPY DIAGNOSTIC with open repair of left inguinal hernia with mesh;  Surgeon: Jerel Hameed DO;  Location: [facility] OR;  Service: General       History reviewed. No pertinent family history. I have reviewed and agree with the history as documented. E-Cigarette/Vaping   • E-Cigarette Use Never User      E-Cigarette/Vaping Substances     Social History     Tobacco Use   • Smoking status: Never   • Smokeless tobacco: Never   Vaping Use   • Vaping Use: Never used   Substance Use Topics   • Alcohol use: Not Currently   • Drug use: Never       Review of Systems   Musculoskeletal: Negative for arthralgias, joint swelling and myalgias. Skin: Positive for wound. Negative for color change and pallor. Neurological: Negative for weakness and numbness. Physical Exam  Physical Exam  Vitals and nursing note reviewed. Constitutional:       General: She is awake. She is not in acute distress. Appearance: Normal appearance. She is not ill-appearing.    HENT:      Head: Normocephalic and atraumatic. Right Ear: Hearing and external ear normal.      Left Ear: Hearing and external ear normal.   Neck:      Trachea: Trachea and phonation normal.   Cardiovascular:      Rate and Rhythm: Normal rate and regular rhythm. Pulses: Normal pulses. Radial pulses are 2+ on the right side and 2+ on the left side. Comments: Ulnar pulses 2+ bilaterally  Pulmonary:      Effort: Pulmonary effort is normal. No tachypnea, accessory muscle usage or respiratory distress. Musculoskeletal:      Right forearm: Laceration (Superficial 0.5 cm laceration dorsal mid-forearm) present. Left forearm: Normal.      Right wrist: Normal.      Left wrist: Normal.      Right hand: Laceration (See skin section) and tenderness (In area of laceration specifically) present. Normal range of motion. Normal strength. Normal sensation. Left hand: Normal.   Skin:     General: Skin is warm and dry. Capillary Refill: Capillary refill takes less than 2 seconds. <2s in all digits of R hand     Comments: 0.5 cm superficial laceration of R dorsal forearm. No FB present. No active bleeding. 1.1 cm longitudinal laceration dorsal R hand between 4th/5th metacarpals. No visible FB. No active bleeding. Able to flex/extend all digits in R hand at MCP/IP joints without difficulty   Neurological:      Mental Status: She is alert and oriented to person, place, and time. GCS: GCS eye subscore is 4. GCS verbal subscore is 5. GCS motor subscore is 6.       Comments: Intact sensation to light touch in all digits of R hand  Intact strength R hand in all digits at MCP and IP joints in flexion/extension         Vital Signs  ED Triage Vitals [07/18/23 1645]   Temperature Pulse Respirations Blood Pressure SpO2   98.1 °F (36.7 °C) 70 18 120/62 98 %      Temp Source Heart Rate Source Patient Position - Orthostatic VS BP Location FiO2 (%)   Temporal Monitor Sitting Right arm --      Pain Score       No Pain           Vitals: 07/18/23 1645   BP: 120/62   Pulse: 70   Patient Position - Orthostatic VS: Sitting         Visual Acuity      ED Medications  Medications   lidocaine-epinephrine (XYLOCAINE/EPINEPHRINE) 1 %-1:100,000 injection 10 mL (has no administration in time range)   tetanus-diphtheria-acellular pertussis (BOOSTRIX) IM injection 0.5 mL (has no administration in time range)       Diagnostic Studies  Results Reviewed     None                 XR hand 3+ views RIGHT   ED Interpretation by Eliza Toth DO (07/18 1717)   No fracture or dislocation  Mild degenerative disease  No radiopaque FB      XR forearm 2 views RIGHT   ED Interpretation by Eliza Toth DO (07/18 1717)   No fracture or dislocation  No radiopaque FB                 Procedures  Universal Protocol:  Consent: Verbal consent obtained. Consent given by: patient  Time out: Immediately prior to procedure a "time out" was called to verify the correct patient, procedure, equipment, support staff and site/side marked as required. Timeout called at: 7/18/2023 5:40 PM.  Required items: required blood products, implants, devices, and special equipment available  Patient identity confirmed: verbally with patient and arm band    Laceration repair    Date/Time: 7/18/2023 5:40 PM    Performed by: Eliza Toth DO  Authorized by: Eliza Toth DO  Location: Right hand. Laceration length: 1.1 cm  Tendon involvement: none  Nerve involvement: none  Anesthesia: local infiltration    Anesthesia:  Local Anesthetic: lidocaine 1% with epinephrine  Anesthetic total: 1 mL    Wound Dehiscence:  Superficial Wound Dehiscence: simple closure      Procedure Details:  Preparation: Patient was prepped and draped in the usual sterile fashion.   Irrigation solution: saline  Irrigation method: jet lavage  Amount of cleaning: standard  Debridement: none  Degree of undermining: none  Skin closure: 4-0 nylon  Number of sutures: 3  Technique: simple  Approximation: close  Approximation difficulty: simple  Dressing: gauze roll  Patient tolerance: patient tolerated the procedure well with no immediate complications               ED Course         SBIRT 22yo+    Flowsheet Row Most Recent Value   Initial Alcohol Screen: US AUDIT-C     1. How often do you have a drink containing alcohol? 0 Filed at: 07/18/2023 1647   2. How many drinks containing alcohol do you have on a typical day you are drinking? 0 Filed at: 07/18/2023 1647   3a. Male UNDER 65: How often do you have five or more drinks on one occasion? 0 Filed at: 07/18/2023 1647   3b. FEMALE Any Age, or MALE 65+: How often do you have 4 or more drinks on one occassion? 0 Filed at: 07/18/2023 1647   Audit-C Score 0 Filed at: 07/18/2023 1647   TAMEKA: How many times in the past year have you. .. Used an illegal drug or used a prescription medication for non-medical reasons? Never Filed at: 07/18/2023 1647            MDM    Disposition  Final diagnoses:   Laceration of right hand   Laceration of right forearm, initial encounter     Time reflects when diagnosis was documented in both MDM as applicable and the Disposition within this note     Time User Action Codes Description Comment    7/18/2023  5:51 PM [place] Standard Add [A60.646F] Laceration of right hand     7/18/2023  5:52 PM [place] Standard Add [I32.952A] Laceration of right forearm, initial encounter       ED Disposition     ED Disposition   Discharge    Condition   Stable    Date/Time   Tue Jul 18, 2023  5:51 PM    Comment   Anu Valentine discharge to home/self care.                Follow-up Information     Follow up With Specialties Details Why [facility] Go in 7 days For suture removal [address] [facility] [id number]  [address] [place] [address] Emergency Department, [address] [place] [address], [id number]          Patient's Medications Discharge Prescriptions    No medications on file       No discharge procedures on file.     PDMP Review     None          ED Provider  Electronically Signed by           Jl Fisher DO  07/18/23 6639

## 2023-07-18 NOTE — DISCHARGE INSTRUCTIONS
Once per day, cover the area with a clean, dry bandage. Leave the bandage on for the rest of the day. Change the bandage if it gets wet or dirty at any time. Continue to do this for the next 7 days. Suture removal in 7-10 days in the ER or with your primary doctor. You can go to the ER at any time if you develop a fever or if the wound develops any pus draining from it, becomes red, becomes more painful, or develops swelling.

## 2023-11-30 ENCOUNTER — APPOINTMENT (OUTPATIENT)
Dept: LAB | Facility: HOSPITAL | Age: 73
End: 2023-11-30
Payer: MEDICARE

## 2023-11-30 ENCOUNTER — HOSPITAL ENCOUNTER (OUTPATIENT)
Dept: CT IMAGING | Facility: HOSPITAL | Age: 73
Discharge: HOME | End: 2023-11-30
Payer: MEDICARE

## 2023-11-30 ENCOUNTER — APPOINTMENT (OUTPATIENT)
Dept: LAB | Facility: MEDICAL CENTER | Age: 73
End: 2023-11-30
Payer: MEDICARE

## 2023-11-30 DIAGNOSIS — R53.83 FATIGUE, UNSPECIFIED TYPE: ICD-10-CM

## 2023-11-30 DIAGNOSIS — M79.10 MYALGIA: ICD-10-CM

## 2023-11-30 DIAGNOSIS — D50.9 IRON DEFICIENCY ANEMIA, UNSPECIFIED IRON DEFICIENCY ANEMIA TYPE: ICD-10-CM

## 2023-11-30 DIAGNOSIS — E55.9 VITAMIN D DEFICIENCY: ICD-10-CM

## 2023-11-30 DIAGNOSIS — I10 HYPERTENSION, UNSPECIFIED TYPE: ICD-10-CM

## 2023-11-30 DIAGNOSIS — E11.9 TYPE 2 DIABETES MELLITUS WITHOUT COMPLICATION, UNSPECIFIED WHETHER LONG TERM INSULIN USE (HCC): ICD-10-CM

## 2023-11-30 DIAGNOSIS — R11.10 VOMITING: ICD-10-CM

## 2023-11-30 DIAGNOSIS — K21.9 GASTROESOPHAGEAL REFLUX DISEASE WITHOUT ESOPHAGITIS: ICD-10-CM

## 2023-11-30 DIAGNOSIS — M25.50 PAIN IN JOINT, MULTIPLE SITES: ICD-10-CM

## 2023-11-30 DIAGNOSIS — E53.8 B12 DEFICIENCY: ICD-10-CM

## 2023-11-30 DIAGNOSIS — E78.5 HYPERLIPIDEMIA, UNSPECIFIED HYPERLIPIDEMIA TYPE: ICD-10-CM

## 2023-11-30 DIAGNOSIS — R10.9 ABDOMINAL PAIN, UNSPECIFIED ABDOMINAL LOCATION: ICD-10-CM

## 2023-11-30 DIAGNOSIS — D64.9 ANEMIA, UNSPECIFIED TYPE: ICD-10-CM

## 2023-11-30 DIAGNOSIS — E03.9 HYPOTHYROIDISM, UNSPECIFIED TYPE: ICD-10-CM

## 2023-11-30 LAB
AMYLASE SERPL-CCNC: 66 IU/L (ref 29–103)
BASOPHILS # BLD AUTO: 0.05 THOUSANDS/ÂΜL (ref 0–0.1)
BASOPHILS NFR BLD AUTO: 1 % (ref 0–1)
EOSINOPHIL # BLD AUTO: 0.23 THOUSAND/ÂΜL (ref 0–0.61)
EOSINOPHIL NFR BLD AUTO: 3 % (ref 0–6)
ERYTHROCYTE [DISTWIDTH] IN BLOOD BY AUTOMATED COUNT: 12.5 % (ref 11.6–15.1)
ERYTHROCYTE [SEDIMENTATION RATE] IN BLOOD: 9 MM/HOUR (ref 0–29)
HCT VFR BLD AUTO: 44.3 % (ref 34.8–46.1)
HGB BLD-MCNC: 14.2 G/DL (ref 11.5–15.4)
IMM GRANULOCYTES # BLD AUTO: 0.03 THOUSAND/UL (ref 0–0.2)
IMM GRANULOCYTES NFR BLD AUTO: 0 % (ref 0–2)
LIPASE SERPL-CCNC: 24 U/L (ref 11–82)
LYMPHOCYTES # BLD AUTO: 2.44 THOUSANDS/ÂΜL (ref 0.6–4.47)
LYMPHOCYTES NFR BLD AUTO: 36 % (ref 14–44)
MCH RBC QN AUTO: 30 PG (ref 26.8–34.3)
MCHC RBC AUTO-ENTMCNC: 32.1 G/DL (ref 31.4–37.4)
MCV RBC AUTO: 94 FL (ref 82–98)
MONOCYTES # BLD AUTO: 0.55 THOUSAND/ÂΜL (ref 0.17–1.22)
MONOCYTES NFR BLD AUTO: 8 % (ref 4–12)
NEUTROPHILS # BLD AUTO: 3.48 THOUSANDS/ÂΜL (ref 1.85–7.62)
NEUTS SEG NFR BLD AUTO: 52 % (ref 43–75)
NRBC BLD AUTO-RTO: 0 /100 WBCS
PLATELET # BLD AUTO: 228 THOUSANDS/UL (ref 149–390)
PMV BLD AUTO: 10 FL (ref 8.9–12.7)
RBC # BLD AUTO: 4.73 MILLION/UL (ref 3.81–5.12)
WBC # BLD AUTO: 6.78 THOUSAND/UL (ref 4.31–10.16)

## 2023-11-30 PROCEDURE — 36415 COLL VENOUS BLD VENIPUNCTURE: CPT

## 2023-11-30 PROCEDURE — G1004 CDSM NDSC: HCPCS

## 2023-11-30 PROCEDURE — 74176 CT ABD & PELVIS W/O CONTRAST: CPT

## 2023-11-30 PROCEDURE — 85652 RBC SED RATE AUTOMATED: CPT

## 2023-11-30 PROCEDURE — 82150 ASSAY OF AMYLASE: CPT

## 2023-11-30 PROCEDURE — 83690 ASSAY OF LIPASE: CPT

## 2023-11-30 PROCEDURE — 87086 URINE CULTURE/COLONY COUNT: CPT

## 2023-11-30 PROCEDURE — 85025 COMPLETE CBC W/AUTO DIFF WBC: CPT

## 2023-12-01 LAB — BACTERIA UR CULT: NORMAL

## 2023-12-05 ENCOUNTER — APPOINTMENT (OUTPATIENT)
Dept: LAB | Facility: MEDICAL CENTER | Age: 73
End: 2023-12-05
Payer: MEDICARE

## 2023-12-05 DIAGNOSIS — R10.9 ABDOMINAL PAIN, UNSPECIFIED ABDOMINAL LOCATION: ICD-10-CM

## 2023-12-05 DIAGNOSIS — I51.9 MYXEDEMA HEART DISEASE: ICD-10-CM

## 2023-12-05 DIAGNOSIS — E78.5 HYPERLIPIDEMIA, UNSPECIFIED HYPERLIPIDEMIA TYPE: ICD-10-CM

## 2023-12-05 DIAGNOSIS — M19.90 OSTEOARTHRITIS, UNSPECIFIED OSTEOARTHRITIS TYPE, UNSPECIFIED SITE: ICD-10-CM

## 2023-12-05 DIAGNOSIS — E03.9 MYXEDEMA HEART DISEASE: ICD-10-CM

## 2023-12-05 DIAGNOSIS — D51.9 ANEMIA DUE TO VITAMIN B12 DEFICIENCY, UNSPECIFIED B12 DEFICIENCY TYPE: ICD-10-CM

## 2023-12-05 DIAGNOSIS — I10 HYPERTENSION, UNSPECIFIED TYPE: ICD-10-CM

## 2023-12-05 LAB
ALBUMIN SERPL BCP-MCNC: 4.3 G/DL (ref 3.5–5)
ALP SERPL-CCNC: 67 U/L (ref 34–104)
ALT SERPL W P-5'-P-CCNC: 17 U/L (ref 7–52)
ANION GAP SERPL CALCULATED.3IONS-SCNC: 8 MMOL/L
AST SERPL W P-5'-P-CCNC: 18 U/L (ref 13–39)
BILIRUB SERPL-MCNC: 0.55 MG/DL (ref 0.2–1)
BUN SERPL-MCNC: 21 MG/DL (ref 5–25)
CALCIUM SERPL-MCNC: 9.7 MG/DL (ref 8.4–10.2)
CHLORIDE SERPL-SCNC: 102 MMOL/L (ref 96–108)
CO2 SERPL-SCNC: 27 MMOL/L (ref 21–32)
CREAT SERPL-MCNC: 0.89 MG/DL (ref 0.6–1.3)
GFR SERPL CREATININE-BSD FRML MDRD: 64 ML/MIN/1.73SQ M
GLUCOSE SERPL-MCNC: 94 MG/DL (ref 65–140)
POTASSIUM SERPL-SCNC: 4.2 MMOL/L (ref 3.5–5.3)
PROT SERPL-MCNC: 7.5 G/DL (ref 6.4–8.4)
SODIUM SERPL-SCNC: 137 MMOL/L (ref 135–147)

## 2023-12-05 PROCEDURE — 36415 COLL VENOUS BLD VENIPUNCTURE: CPT

## 2023-12-05 PROCEDURE — 80053 COMPREHEN METABOLIC PANEL: CPT

## 2024-04-11 ENCOUNTER — HOSPITAL ENCOUNTER (OUTPATIENT)
Dept: MRI IMAGING | Facility: HOSPITAL | Age: 74
Discharge: HOME | End: 2024-04-11
Payer: MEDICARE

## 2024-04-11 DIAGNOSIS — R42 VERTIGO: ICD-10-CM

## 2024-04-11 PROCEDURE — 70547 MR ANGIOGRAPHY NECK W/O DYE: CPT

## 2024-09-14 ENCOUNTER — OFFICE VISIT (OUTPATIENT)
Dept: URGENT CARE | Facility: CLINIC | Age: 74
End: 2024-09-14
Payer: MEDICARE

## 2024-09-14 VITALS
HEIGHT: 66 IN | OXYGEN SATURATION: 99 % | RESPIRATION RATE: 18 BRPM | DIASTOLIC BLOOD PRESSURE: 65 MMHG | HEART RATE: 65 BPM | SYSTOLIC BLOOD PRESSURE: 123 MMHG | WEIGHT: 210 LBS | BODY MASS INDEX: 33.75 KG/M2 | TEMPERATURE: 98 F

## 2024-09-14 DIAGNOSIS — S61.210A LACERATION OF RIGHT INDEX FINGER WITHOUT FOREIGN BODY WITHOUT DAMAGE TO NAIL, INITIAL ENCOUNTER: Primary | ICD-10-CM

## 2024-09-14 PROCEDURE — 12001 RPR S/N/AX/GEN/TRNK 2.5CM/<: CPT | Performed by: PHYSICIAN ASSISTANT

## 2024-09-14 NOTE — PATIENT INSTRUCTIONS
Keep the stitches clean and dry.  Do not get the stitches wet for the 1st 24 hours.  The stitches should be covered with a clean dressing daily and a small coating of antibiotic ointment.  Watch for signs of infection such as increased swelling increased redness pus from the wound or red streaking.  If you notice infection, call your family doctor for recheck.  Stitches should be removed in 10-14 days.

## 2024-09-14 NOTE — PROGRESS NOTES
[facility]    NAME: Miranda Donohue is a 74 y.o. female  : 1950    MRN: [id number]  DATE: 2024  TIME: 12:50 PM    Assessment and Plan   Laceration of right index finger without foreign body without damage to nail, initial encounter [S61.210A]  1. Laceration of right index finger without foreign body without damage to nail, initial encounter        Laceration repair    Date/Time: 2024 11:30 AM    Performed by: Michelle Behler, PA-C  Authorized by: Michelle Behler, PA-C  Body area: upper extremity  Location details: right index finger  Laceration length: 2 cm  Foreign bodies: no foreign bodies  Tendon involvement: none  Nerve involvement: none  Anesthesia: local infiltration    Anesthesia:  Local Anesthetic: lidocaine 1% without epinephrine  Anesthetic total: 3 mL      Procedure Details:  Preparation: Patient was prepped and draped in the usual sterile fashion.  Irrigation solution: saline  Amount of cleaning: standard  Debridement: none  Degree of undermining: none  Skin closure: 5-0 nylon  Number of sutures: 3  Technique: simple  Approximation: close  Approximation difficulty: simple  Dressing: antibiotic ointment and gauze roll  Patient tolerance: patient tolerated the procedure well with no immediate complications            Patient Instructions     Patient Instructions   Keep the stitches clean and dry.  Do not get the stitches wet for the 1st 24 hours.  The stitches should be covered with a clean dressing daily and a small coating of antibiotic ointment.  Watch for signs of infection such as increased swelling increased redness pus from the wound or red streaking.  If you notice infection, call your family doctor for recheck.  Stitches should be removed in 10-14 days.      Chief Complaint     Chief Complaint   Patient presents with    Laceration     Cut right index finger with scissors 30 minutes ago       History of Present Illness   Pt is a 75 y/o female presenting with a  R index finger laceration 30 minutes ago. Pt states earlier this morning she was trying to open up an item she bought with scissors and did not realize her finger was there. Pt states that the knife was clean. She tried to clean it off and bandage it till she realized it was pretty deep she only wrapped it with a cloth till she arrived. She has never injured that finger before. She states that her last tetanus was in 2023.         Review of Systems   Review of Systems   Neurological:  Negative for weakness and numbness.       Current Medications     Current Outpatient Medications:     citalopram (CeleXA) 20 mg tablet, Take 20 mg by mouth daily, Disp: , Rfl:     hydrochlorothiazide (HYDRODIURIL) 12.5 mg tablet, Take 12.5 mg by mouth daily, Disp: , Rfl:     lisinopril (ZESTRIL) 40 mg tablet, Take 40 mg by mouth daily, Disp: , Rfl:     ondansetron (ZOFRAN) 4 mg tablet, Take 1 tablet (4 mg total) by mouth every 8 (eight) hours as needed for nausea (Patient not taking: Reported on 4/6/2023), Disp: 20 tablet, Rfl: 0    saccharomyces boulardii (FLORASTOR) 250 mg capsule, Take 1 capsule (250 mg total) by mouth 2 (two) times a day (Patient not taking: Reported on 4/6/2023), Disp: 14 capsule, Rfl: 0    traMADol (Ultram) 50 mg tablet, Take 1 tablet (50 mg total) by mouth every 6 (six) hours as needed for moderate pain, Disp: 20 tablet, Rfl: 0    Current Allergies     Allergies as of 09/14/2024    (No Known Allergies)          The following portions of the patient's history were reviewed and updated as appropriate: allergies, current medications, past family history, past medical history, past social history, past surgical history and problem list.   Past Medical History:   Diagnosis Date    Diverticulitis     Hypertension     PONV (postoperative nausea and vomiting)     Psychiatric disorder     depression     Past Surgical History:   Procedure Laterality Date    APPENDECTOMY      CHOLECYSTECTOMY      HYSTERECTOMY      FL LAPS  "ABD PRTM&OMENTUM DX W/WO SPEC BR/WA SPX Left 5/5/2023    Procedure: LAPAROSCOPY DIAGNOSTIC with open repair of left inguinal hernia with mesh;  Surgeon: Juan Carlos Boyce DO;  Location: [facility];  Service: General     History reviewed. No pertinent family history.  Social History     Socioeconomic History    Marital status: /Civil Union     Spouse name: Not on file    Number of children: Not on file    Years of education: Not on file    Highest education level: Not on file   Occupational History    Not on file   Tobacco Use    Smoking status: Never    Smokeless tobacco: Never   Vaping Use    Vaping status: Never Used   Substance and Sexual Activity    Alcohol use: Not Currently    Drug use: Never    Sexual activity: Not on file   Other Topics Concern    Not on file   Social History Narrative    Not on file     Social Determinants of Health     Financial Resource Strain: Not on file   Food Insecurity: No Food Insecurity (3/28/2023)    Hunger Vital Sign     Worried About Running Out of Food in the Last Year: Never true     Ran Out of Food in the Last Year: Never true   Transportation Needs: No Transportation Needs (3/28/2023)    PRAPARE - Transportation     Lack of Transportation (Medical): No     Lack of Transportation (Non-Medical): No   Physical Activity: Not on file   Stress: Not on file   Social Connections: Not on file   Intimate Partner Violence: Not on file   Housing Stability: Low Risk  (3/28/2023)    Housing Stability Vital Sign     Unable to Pay for Housing in the Last Year: No     Number of Places Lived in the Last Year: 1     Unstable Housing in the Last Year: No     Medications have been verified.    Objective   /65   Pulse 65   Temp 98 °F (36.7 °C) (Temporal)   Resp 18   Ht 5' 6\" (1.676 m)   Wt 95.3 kg (210 lb)   SpO2 99%   BMI 33.89 kg/m²      Physical Exam   Physical Exam  Musculoskeletal:      Right hand: Laceration and tenderness present. Normal range of motion. Normal strength. " Normal sensation. Normal capillary refill.        Hands:       Comments: 2.0 cm laceration on lateral side of R index finger.

## 2024-12-31 ENCOUNTER — APPOINTMENT (OUTPATIENT)
Dept: RADIOLOGY | Facility: MEDICAL CENTER | Age: 74
End: 2024-12-31
Payer: MEDICARE

## 2024-12-31 ENCOUNTER — APPOINTMENT (OUTPATIENT)
Dept: LAB | Facility: MEDICAL CENTER | Age: 74
End: 2024-12-31
Payer: MEDICARE

## 2024-12-31 ENCOUNTER — OFFICE VISIT (OUTPATIENT)
Dept: OBGYN CLINIC | Facility: CLINIC | Age: 74
End: 2024-12-31
Payer: MEDICARE

## 2024-12-31 VITALS
TEMPERATURE: 96.1 F | OXYGEN SATURATION: 97 % | HEART RATE: 61 BPM | HEIGHT: 66 IN | RESPIRATION RATE: 16 BRPM | BODY MASS INDEX: 33.33 KG/M2 | WEIGHT: 207.4 LBS

## 2024-12-31 DIAGNOSIS — N17.9 AKI (ACUTE KIDNEY INJURY) (HCC): ICD-10-CM

## 2024-12-31 DIAGNOSIS — M25.562 LEFT KNEE PAIN, UNSPECIFIED CHRONICITY: ICD-10-CM

## 2024-12-31 DIAGNOSIS — M17.12 PRIMARY OSTEOARTHRITIS OF LEFT KNEE: Primary | ICD-10-CM

## 2024-12-31 LAB
ALBUMIN SERPL BCG-MCNC: 4.2 G/DL (ref 3.5–5)
ALP SERPL-CCNC: 59 U/L (ref 34–104)
ALT SERPL W P-5'-P-CCNC: 20 U/L (ref 7–52)
ANION GAP SERPL CALCULATED.3IONS-SCNC: 9 MMOL/L (ref 4–13)
AST SERPL W P-5'-P-CCNC: 22 U/L (ref 13–39)
BILIRUB SERPL-MCNC: 0.65 MG/DL (ref 0.2–1)
BUN SERPL-MCNC: 16 MG/DL (ref 5–25)
CALCIUM SERPL-MCNC: 9.4 MG/DL (ref 8.4–10.2)
CHLORIDE SERPL-SCNC: 102 MMOL/L (ref 96–108)
CO2 SERPL-SCNC: 27 MMOL/L (ref 21–32)
CREAT SERPL-MCNC: 0.84 MG/DL (ref 0.6–1.3)
GFR SERPL CREATININE-BSD FRML MDRD: 68 ML/MIN/1.73SQ M
GLUCOSE P FAST SERPL-MCNC: 80 MG/DL (ref 65–99)
POTASSIUM SERPL-SCNC: 4.1 MMOL/L (ref 3.5–5.3)
PROT SERPL-MCNC: 7.2 G/DL (ref 6.4–8.4)
SODIUM SERPL-SCNC: 138 MMOL/L (ref 135–147)
URATE SERPL-MCNC: 7.4 MG/DL (ref 2–7.5)

## 2024-12-31 PROCEDURE — 36415 COLL VENOUS BLD VENIPUNCTURE: CPT

## 2024-12-31 PROCEDURE — 84550 ASSAY OF BLOOD/URIC ACID: CPT

## 2024-12-31 PROCEDURE — 73564 X-RAY EXAM KNEE 4 OR MORE: CPT

## 2024-12-31 PROCEDURE — 80053 COMPREHEN METABOLIC PANEL: CPT

## 2024-12-31 PROCEDURE — 20610 DRAIN/INJ JOINT/BURSA W/O US: CPT | Performed by: STUDENT IN AN ORGANIZED HEALTH CARE EDUCATION/TRAINING PROGRAM

## 2024-12-31 PROCEDURE — 99204 OFFICE O/P NEW MOD 45 MIN: CPT | Performed by: STUDENT IN AN ORGANIZED HEALTH CARE EDUCATION/TRAINING PROGRAM

## 2024-12-31 RX ORDER — BUPIVACAINE HYDROCHLORIDE 2.5 MG/ML
2 INJECTION, SOLUTION INFILTRATION; PERINEURAL
Status: COMPLETED | OUTPATIENT
Start: 2024-12-31 | End: 2024-12-31

## 2024-12-31 RX ORDER — LIDOCAINE HYDROCHLORIDE 20 MG/ML
2 INJECTION, SOLUTION EPIDURAL; INFILTRATION; INTRACAUDAL; PERINEURAL
Status: COMPLETED | OUTPATIENT
Start: 2024-12-31 | End: 2024-12-31

## 2024-12-31 RX ORDER — TRIAMCINOLONE ACETONIDE 40 MG/ML
40 INJECTION, SUSPENSION INTRA-ARTICULAR; INTRAMUSCULAR
Status: COMPLETED | OUTPATIENT
Start: 2024-12-31 | End: 2024-12-31

## 2024-12-31 RX ADMIN — TRIAMCINOLONE ACETONIDE 40 MG: 40 INJECTION, SUSPENSION INTRA-ARTICULAR; INTRAMUSCULAR at 09:45

## 2024-12-31 RX ADMIN — BUPIVACAINE HYDROCHLORIDE 2 ML: 2.5 INJECTION, SOLUTION INFILTRATION; PERINEURAL at 09:45

## 2024-12-31 RX ADMIN — LIDOCAINE HYDROCHLORIDE 2 ML: 20 INJECTION, SOLUTION EPIDURAL; INFILTRATION; INTRACAUDAL; PERINEURAL at 09:45

## 2024-12-31 NOTE — PROGRESS NOTES
Orthopedic Surgery Office Note  Miranda Donohue (74 y.o. female)   : 1950   MRN: [id number]   Encounter Date: 2024 with Dr. Kun Mena,   Chief Complaint   Patient presents with   • Left Knee - Pain     Old injury in 2017.  Fell on knee.  Has trouble with left knee since.       Assessment, Plan, & Discussion:     Osteoarthritis of Left Knee   -Discussed with patient that arthritis is a chronic, incurable, irreversible disease and that management would be focused on alleviating symptoms, as it is not possible to reverse or remove the degenerative changes. Discussed treatment options to include symptom masking with voltaren gel and OTC pain relievers, muscle strengthening with PT to have the joint rely on strong muscle rather than bad bone, and possible consideration of corticosteroid or viscosupplementation injections in the future. Discussed definitive treatment as total joint replacement, which would be an elective, pain-relieving procedure, and that symptoms and radiographs may not align, leaving it to the patient to determine when the symptoms would warrant the surgery.   - PT script Deferred by patient  - The risks and benefits of the injection (which include but are not limited to: infection, bleeding, damage to nerve/artery, need for further intervention), as well as the risks and benefits of all alternative treatments were explained and understood.  The patient elected to proceed with injection. The procedure was done with aseptic technique, and the patient tolerated the procedure well with no complications.  A corticosteroid injection to the left knee was performed in the office today.  May consider future corticosteroid injection depending on clinical exam/imaging.  - Follow up: prn     2.   CKD  -Patient requested CMP and Uric Acid Labs to follow her history of CKD. Labs were ordered, however, the patient was instructed to follow-up with PCP regarding results. She has a scheduled  wellness visit on January 6th, 2025 and will plan to review labs at that time.      Return if symptoms worsen or fail to improve.      Surgery:   No surgery planned at this time      Orders:     Diagnoses and all orders for this visit:    Primary osteoarthritis of left knee  -     Large joint arthrocentesis: L knee    Left knee pain, unspecified chronicity  -     XR knee 4+ vw left injury; Future    HARRIS (acute kidney injury) (HCC)  -     Comprehensive metabolic panel; Future  -     Uric acid; Future         History of Present Illness:     Miranda Donohue is a 74 y.o. female who presents for consultation at the request of Self, Referral regarding left knee pain. The patient reports a fall in 2017 which resulted in intermittent knee pain throughout the years. In the past year, the pain has become more consistent as she has been solely caring for her house, with her  being ill. She reports pain in the medial and lateral aspects of the knee. She states that her pain is exacerbated with weightbearing activities and increased activities. The patient also reports some back pain and lateral thigh pain, as she states she drags her foot once the knee flares. The patient does report a recent history of gout, which was treated with a single dose of her 's colchicine. The patient does also have a history of CKD and cannot take NSAIDs. She takes Tylenol to manage her symptoms, which has not been as effective recently. The patient has previously completed physical therapy which worsened her symptoms. The patient has also tried topical CBD which has been effective.     Review of Systems  Constitutional: Negative for fatigue, fever or loss of appetite.   HENT: Negative.    Respiratory: Negative for shortness of breath, dyspnea.    Cardiovascular: Negative for chest pain/tightness.   Gastrointestinal: Negative for abdominal pain, N/V.   Endocrine: Negative for cold/heat intolerance, unexplained weight loss/gain.  "  Genitourinary: Negative for flank pain, dysuria  Skin: Negative for rash.    Psychiatric/Behavioral: Negative for agitation.  All else negative unless otherwise noted in HPI    Physical Exam:   General:  Pulse 61   Temp (!) 96.1 °F (35.6 °C) (Temporal)   Resp 16   Ht 5' 6\" (1.676 m)   Wt 94.1 kg (207 lb 6.4 oz)   SpO2 97%   BMI 33.48 kg/m²   Cons: Appears well.  No apparent distress.  Psych: Alert. Oriented x3.  Mood and affect normal.  Eyes: PERRLA, EOMI  Resp: Normal effort.  No audible wheezing or stridor.  CV: Extremities warm and well perfused.   Abd:    No distention or guarding.   Skin: Warm. No visible lesions.  Neuro: Normal muscle tone.      Orthopedic Exam:   Left Knee Exam  Alignment:  Normal knee alignment.  Inspection:   Palpable Baker's Cyst  Palpation:   Medial and lateral joint line tenderness  ROM:  Normal knee ROM.  Strength:  5/5 hip flexors and abductors. 5/5 quadriceps and hamstrings.  Stability:  No objective knee instability. Stable Varus / Valgus stress, Lachman, and Posterior drawer.  Tests:  No pertinent positive or negative tests.  Patella:  Patella tracks centrally with crepitus.  Neurovascular:  Sensation intact in DP/SP/Tineo/Sa/T nerve distributions.  2+ DP & PT pulses.  Gait:  Normal.       Imaging/Studies:     Study: XR of Left Knee  Date: 12/31/2024  Report: I have personally reviewed the imaging in PACS and my impression is as follows:  I have personally reviewed imaging and my impression is as follows: moderate tricompartmental osteoarthritis of the tibiofemoral joint, affecting primarily the medial compartment.     Large joint arthrocentesis: L knee  Universal Protocol:  procedure performed by consultantConsent: Verbal consent obtained.  Risks and benefits: risks, benefits and alternatives were discussed  Consent given by: patient  Timeout called at: 12/31/2024 10:21 AM.  Patient understanding: patient states understanding of the procedure being performed  Patient consent: " the patient's understanding of the procedure matches consent given  Site marked: the operative site was marked  Radiology Images displayed and confirmed. If images not available, report reviewed: imaging studies available  Patient identity confirmed: verbally with patient  Supporting Documentation  Indications: pain and joint swelling   Procedure Details  Location: knee - L knee  Needle gauge: 21 G.  Ultrasound guidance: no  Approach: anteromedial  Medications administered: 2 mL bupivacaine 0.25 %; 2 mL lidocaine (PF) 2 %; 40 mg triamcinolone acetonide 40 mg/mL    Patient tolerance: patient tolerated the procedure well with no immediate complications  Dressing:  Sterile dressing applied            Medical, Surgical, Family, and Social History    The patient's medical history, family history, and social history, were reviewed and updated as appropriate.    Past Medical History:   Diagnosis Date   • Diverticulitis    • Hypertension    • PONV (postoperative nausea and vomiting)    • Psychiatric disorder     depression     Past Surgical History:   Procedure Laterality Date   • APPENDECTOMY     • CHOLECYSTECTOMY     • HYSTERECTOMY     • LA LAPS ABD PRTM&OMENTUM DX W/WO SPEC BR/WA SPX Left 5/5/2023    Procedure: LAPAROSCOPY DIAGNOSTIC with open repair of left inguinal hernia with mesh;  Surgeon: Juan Carlos Boyce DO;  Location: [facility];  Service: General     No family history on file.  Social History     Occupational History   • Not on file   Tobacco Use   • Smoking status: Never   • Smokeless tobacco: Never   Vaping Use   • Vaping status: Never Used   Substance and Sexual Activity   • Alcohol use: Not Currently   • Drug use: Never   • Sexual activity: Not on file     No Known Allergies    Current Outpatient Medications:   •  citalopram (CeleXA) 20 mg tablet, Take 20 mg by mouth daily, Disp: , Rfl:   •  hydrochlorothiazide (HYDRODIURIL) 12.5 mg tablet, Take 12.5 mg by mouth daily, Disp: , Rfl:   •  lisinopril (ZESTRIL) 40  mg tablet, Take 40 mg by mouth daily, Disp: , Rfl:   •  ondansetron (ZOFRAN) 4 mg tablet, Take 1 tablet (4 mg total) by mouth every 8 (eight) hours as needed for nausea (Patient not taking: Reported on 4/6/2023), Disp: 20 tablet, Rfl: 0  •  saccharomyces boulardii (FLORASTOR) 250 mg capsule, Take 1 capsule (250 mg total) by mouth 2 (two) times a day (Patient not taking: Reported on 4/6/2023), Disp: 14 capsule, Rfl: 0  •  traMADol (Ultram) 50 mg tablet, Take 1 tablet (50 mg total) by mouth every 6 (six) hours as needed for moderate pain, Disp: 20 tablet, Rfl: 0        Scribe Attestation    I,:  Jorge Lynn am acting as a scribe while in the presence of the attending physician.:       I,:  Kun Mena DO personally performed the services described in this documentation    as scribed in my presence.:           Dr. Kun Mena DO, Orthopedic Surgeon  [facility]

## (undated) DEVICE — ENDOPATH 5 MM GRASPERS WITH RATCHET HANDLES: Brand: ENDOPATH

## (undated) DEVICE — SUT VICRYL 0 UR-6 27 IN J603H

## (undated) DEVICE — 1820 FOAM BLOCK NEEDLE COUNTER: Brand: DEVON

## (undated) DEVICE — SUT VICRYL 3-0 SH 27 IN J416H

## (undated) DEVICE — BASIC SINGLE BASIN 2-LF: Brand: MEDLINE INDUSTRIES, INC.

## (undated) DEVICE — PROXIMATE RELOADABLE LINEAR CUTTER WITH SAFETY LOCK-OUT, 100MM: Brand: PROXIMATE

## (undated) DEVICE — 5 MM BABCOCKS WITH RATCHET HANDLES: Brand: ENDOPATH

## (undated) DEVICE — TROCAR: Brand: KII SLEEVE

## (undated) DEVICE — SUT MONOCRYL 4-0 PS-2 27 IN Y426H

## (undated) DEVICE — PROXIMATE LINEAR CUTTER RELOADS (STANDARD)-100MM: Brand: PROXIMATE

## (undated) DEVICE — PAD GROUNDING ADULT

## (undated) DEVICE — ANTI-FOG SOLUTION WITH FOAM PAD: Brand: DEVON

## (undated) DEVICE — IRRIG ENDO FLO TUBING

## (undated) DEVICE — WOUND RETRACTOR AND PROTECTOR: Brand: ALEXIS O WOUND PROTECTOR-RETRACTOR

## (undated) DEVICE — GLOVE INDICATOR PI UNDERGLOVE SZ 7 BLUE

## (undated) DEVICE — POOLE SUCTION HANDLE: Brand: CARDINAL HEALTH

## (undated) DEVICE — SUT VICRYL 2-0 REEL 54 IN J286G

## (undated) DEVICE — TOWEL SET X-RAY

## (undated) DEVICE — TROCARS: Brand: KII® BALLOON BLUNT TIP SYSTEM

## (undated) DEVICE — SYRINGE 10ML LL

## (undated) DEVICE — LAPAROSCOPIC SMOKE EVAC TUBING

## (undated) DEVICE — GAUZE SPONGES,8 PLY: Brand: CURITY

## (undated) DEVICE — PROXIMATE LINEAR STAPLER RELOADS: Brand: PROXIMATE

## (undated) DEVICE — 3M™ TEGADERM™ TRANSPARENT FILM DRESSING FRAME STYLE, 1626W, 4 IN X 4-3/4 IN (10 CM X 12 CM), 50/CT 4CT/CASE: Brand: 3M™ TEGADERM™

## (undated) DEVICE — GRADUATE PLASTIC 1000 ML

## (undated) DEVICE — STRL COTTON TIP APPLCTR 6IN PK: Brand: CARDINAL HEALTH

## (undated) DEVICE — SWABSTCK, BENZOIN TINCTURE, 1/PK, STRL: Brand: APLICARE

## (undated) DEVICE — PLUMEPEN PRO 10FT

## (undated) DEVICE — 1840 FOAM BLOCK NEEDLE COUNTER: Brand: DEVON

## (undated) DEVICE — LIGHT GLOVE GREEN

## (undated) DEVICE — GRASPER ATRAUMATIC FEN 5MM X 33CM ROTATING THUMB LOOP GENI-SURGE

## (undated) DEVICE — INTENDED FOR TISSUE SEPARATION, AND OTHER PROCEDURES THAT REQUIRE A SHARP SURGICAL BLADE TO PUNCTURE OR CUT.: Brand: BARD-PARKER SAFETY BLADES SIZE 15, STERILE

## (undated) DEVICE — GENERAL ENDOSCOPY PACK: Brand: CONVERTORS

## (undated) DEVICE — [HIGH FLOW INSUFFLATOR,  DO NOT USE IF PACKAGE IS DAMAGED,  KEEP DRY,  KEEP AWAY FROM SUNLIGHT,  PROTECT FROM HEAT AND RADIOACTIVE SOURCES.]: Brand: PNEUMOSURE

## (undated) DEVICE — NEEDLE 25G X 1 1/2

## (undated) DEVICE — DRAPE EQUIPMENT RF WAND

## (undated) DEVICE — NON-REINFORCED SURGICAL GOWN, S/M: Brand: CONVERTORS

## (undated) DEVICE — SPONGE LAP 18 X 18 IN STRL RFD

## (undated) DEVICE — BETHLEHEM MAJOR GENERAL PACK: Brand: CARDINAL HEALTH

## (undated) DEVICE — CHLORAPREP HI-LITE 26ML ORANGE

## (undated) DEVICE — SKIN MARKER DUAL TIP WITH RULER CAP, FLEXIBLE RULER AND LABELS: Brand: DEVON

## (undated) DEVICE — 3M™ TEGADERM™ TRANSPARENT FILM DRESSING FRAME STYLE, 1624W, 2-3/8 IN X 2-3/4 IN (6 CM X 7 CM), 100/CT 4CT/CASE: Brand: 3M™ TEGADERM™

## (undated) DEVICE — TRANSPOSAL ULTRAFLEX DUO/QUAD ULTRA CART MANIFOLD

## (undated) DEVICE — X-RAY DETECTABLE SPONGES,16 PLY: Brand: VISTEC

## (undated) DEVICE — DOLPHIN TIP LAPAROSCOPIC SEALER/DIVIDER: Brand: LIGASURE

## (undated) DEVICE — UTILITY MARKER,BLACK WITH LABELS: Brand: DEVON

## (undated) DEVICE — 5 MM CURVED DISSECTORS WITH MONOPOLAR CAUTERY: Brand: ENDOPATH

## (undated) DEVICE — Device: Brand: SUCTION TUBING SET

## (undated) DEVICE — METZENBAUM ADTEC SINGLE USE DISSECTING SCISSORS, SHAFT ONLY, MONOPOLAR, CURVED TO LEFT, WORKING LENGTH: 12 1/4", (310 MM), DIAM. 5 MM, INSULATED, DOUBLE ACTION, STERILE, DISPOSABLE, PACKAGE OF 10 PIECES: Brand: AESCULAP

## (undated) DEVICE — NEEDLE SPINAL 22G X 3.5IN  QUINCKE

## (undated) DEVICE — SUT VICRYL 2-0 SH 27 IN UNDYED J417H

## (undated) DEVICE — SPONGE LAP 18 X 18 IN

## (undated) DEVICE — ELECTRODE LAP L WIRE E-Z CLEAN 33CM -0100

## (undated) DEVICE — MEDI-VAC YANK SUCT HNDL W/TPRD BULBOUS TIP: Brand: CARDINAL HEALTH

## (undated) DEVICE — TRAY FOLEY 16FR URIMETER SURESTEP

## (undated) DEVICE — SUT VICRYL 2-0 CT-1 36 IN J945H

## (undated) DEVICE — TUBING SUCTION 5MM X 12 FT

## (undated) DEVICE — PROXIMATE RELOADABLE LINEAR STAPLER: Brand: PROXIMATE

## (undated) DEVICE — SUCTION SCINTILLANT LIGHT

## (undated) DEVICE — GLOVE SRG BIOGEL 7

## (undated) DEVICE — GLOVE SRG BIOGEL ECLIPSE 7

## (undated) DEVICE — ASTOUND STANDARD SURGICAL GOWN, XL: Brand: CONVERTORS

## (undated) DEVICE — CLOSURE DEVICE ENDO CLOSE

## (undated) DEVICE — SUT PDS II 0 CT-1 36 IN Z346H

## (undated) DEVICE — BULB SYRINGE,IRRIGATION WITH PROTECTIVE CAP: Brand: DOVER

## (undated) DEVICE — ENDOPATH PNEUMONEEDLE INSUFFLATION NEEDLES WITH LUER LOCK CONNECTORS 150MM: Brand: ENDOPATH

## (undated) DEVICE — SUT PDS II 1 TP-1 96 IN Z880G

## (undated) DEVICE — LIGACLIP 10-M/L, 10MM ENDOSCOPIC ROTATING MULTIPLE CLIP APPLIERS: Brand: LIGACLIP

## (undated) DEVICE — 3M™ STERI-STRIP™ REINFORCED ADHESIVE SKIN CLOSURES, R1546, 1/4 IN X 4 IN (6 MM X 100 MM), 10 STRIPS/ENVELOPE: Brand: 3M™ STERI-STRIP™

## (undated) DEVICE — GAUZE SPONGES,16 PLY: Brand: CURITY

## (undated) DEVICE — INSUFFLATION TUBING PRIMFLO